# Patient Record
Sex: FEMALE | Race: OTHER | HISPANIC OR LATINO
[De-identification: names, ages, dates, MRNs, and addresses within clinical notes are randomized per-mention and may not be internally consistent; named-entity substitution may affect disease eponyms.]

---

## 2017-03-02 ENCOUNTER — APPOINTMENT (OUTPATIENT)
Dept: GASTROENTEROLOGY | Facility: CLINIC | Age: 72
End: 2017-03-02

## 2017-05-30 ENCOUNTER — APPOINTMENT (OUTPATIENT)
Dept: GASTROENTEROLOGY | Facility: CLINIC | Age: 72
End: 2017-05-30

## 2017-05-30 VITALS
DIASTOLIC BLOOD PRESSURE: 85 MMHG | BODY MASS INDEX: 25.91 KG/M2 | WEIGHT: 132 LBS | HEIGHT: 60 IN | SYSTOLIC BLOOD PRESSURE: 132 MMHG | HEART RATE: 75 BPM

## 2017-07-17 ENCOUNTER — OUTPATIENT (OUTPATIENT)
Dept: OUTPATIENT SERVICES | Facility: HOSPITAL | Age: 72
LOS: 1 days | End: 2017-07-17
Payer: COMMERCIAL

## 2017-07-17 ENCOUNTER — APPOINTMENT (OUTPATIENT)
Dept: GASTROENTEROLOGY | Facility: GI CENTER | Age: 72
End: 2017-07-17

## 2017-07-17 DIAGNOSIS — Z12.11 ENCOUNTER FOR SCREENING FOR MALIGNANT NEOPLASM OF COLON: ICD-10-CM

## 2017-07-17 DIAGNOSIS — Z86.010 PERSONAL HISTORY OF COLONIC POLYPS: ICD-10-CM

## 2017-07-17 PROCEDURE — T1013: CPT

## 2017-07-17 PROCEDURE — G0105: CPT

## 2018-01-08 ENCOUNTER — APPOINTMENT (OUTPATIENT)
Dept: NEUROLOGY | Facility: CLINIC | Age: 73
End: 2018-01-08
Payer: MEDICAID

## 2018-01-08 VITALS
BODY MASS INDEX: 25.91 KG/M2 | HEIGHT: 60 IN | WEIGHT: 132 LBS | DIASTOLIC BLOOD PRESSURE: 84 MMHG | SYSTOLIC BLOOD PRESSURE: 136 MMHG

## 2018-01-08 DIAGNOSIS — Z86.39 PERSONAL HISTORY OF OTHER ENDOCRINE, NUTRITIONAL AND METABOLIC DISEASE: ICD-10-CM

## 2018-01-08 PROCEDURE — 99204 OFFICE O/P NEW MOD 45 MIN: CPT

## 2018-01-08 RX ORDER — CYCLOBENZAPRINE HYDROCHLORIDE 10 MG/1
10 TABLET, FILM COATED ORAL
Refills: 0 | Status: COMPLETED | COMMUNITY
End: 2018-01-08

## 2018-01-08 RX ORDER — ASPIRIN ENTERIC COATED TABLETS 81 MG 81 MG/1
81 TABLET, DELAYED RELEASE ORAL
Refills: 0 | Status: ACTIVE | COMMUNITY

## 2018-01-08 RX ORDER — ENALAPRIL MALEATE 20 MG/1
20 TABLET ORAL
Refills: 0 | Status: ACTIVE | COMMUNITY

## 2018-01-08 RX ORDER — LOVASTATIN 10 MG/1
10 TABLET ORAL
Refills: 0 | Status: COMPLETED | COMMUNITY
End: 2018-01-08

## 2018-01-08 RX ORDER — POLYETHYLENE GLYOCOL 3350, SODIUM CHLORIDE, SODIUM BICARBONATE AND POTASSIUM CHLORIDE 420; 11.2; 5.72; 1.48 G/4L; G/4L; G/4L; G/4L
420 POWDER, FOR SOLUTION NASOGASTRIC; ORAL
Qty: 1 | Refills: 0 | Status: COMPLETED | COMMUNITY
Start: 2017-05-30 | End: 2018-01-08

## 2018-01-18 ENCOUNTER — APPOINTMENT (OUTPATIENT)
Dept: NEUROLOGY | Facility: CLINIC | Age: 73
End: 2018-01-18
Payer: MEDICAID

## 2018-01-18 PROCEDURE — 93886 INTRACRANIAL COMPLETE STUDY: CPT

## 2018-01-18 PROCEDURE — 93890: CPT

## 2018-01-18 PROCEDURE — 93880 EXTRACRANIAL BILAT STUDY: CPT

## 2018-02-14 ENCOUNTER — APPOINTMENT (OUTPATIENT)
Dept: NEUROLOGY | Facility: CLINIC | Age: 73
End: 2018-02-14
Payer: MEDICAID

## 2018-02-14 VITALS
WEIGHT: 132 LBS | DIASTOLIC BLOOD PRESSURE: 82 MMHG | SYSTOLIC BLOOD PRESSURE: 130 MMHG | HEIGHT: 60 IN | BODY MASS INDEX: 25.91 KG/M2

## 2018-02-14 PROCEDURE — 99214 OFFICE O/P EST MOD 30 MIN: CPT

## 2018-02-14 RX ORDER — LOVASTATIN 40 MG/1
40 TABLET ORAL
Qty: 30 | Refills: 0 | Status: COMPLETED | COMMUNITY
Start: 2017-11-30

## 2018-02-14 RX ORDER — ERGOCALCIFEROL 1.25 MG/1
1.25 MG CAPSULE, LIQUID FILLED ORAL
Qty: 4 | Refills: 0 | Status: ACTIVE | COMMUNITY
Start: 2017-12-30

## 2018-06-15 ENCOUNTER — APPOINTMENT (OUTPATIENT)
Dept: NEUROLOGY | Facility: CLINIC | Age: 73
End: 2018-06-15
Payer: MEDICAID

## 2018-06-15 VITALS
SYSTOLIC BLOOD PRESSURE: 120 MMHG | WEIGHT: 132 LBS | DIASTOLIC BLOOD PRESSURE: 60 MMHG | HEIGHT: 60 IN | BODY MASS INDEX: 25.91 KG/M2

## 2018-06-15 DIAGNOSIS — Z86.79 PERSONAL HISTORY OF OTHER DISEASES OF THE CIRCULATORY SYSTEM: ICD-10-CM

## 2018-06-15 PROCEDURE — 99214 OFFICE O/P EST MOD 30 MIN: CPT

## 2018-06-15 RX ORDER — VALACYCLOVIR 1 G/1
1 TABLET, FILM COATED ORAL
Qty: 30 | Refills: 0 | Status: COMPLETED | COMMUNITY
Start: 2017-12-30

## 2018-10-17 ENCOUNTER — APPOINTMENT (OUTPATIENT)
Dept: NEUROLOGY | Facility: CLINIC | Age: 73
End: 2018-10-17
Payer: MEDICAID

## 2018-10-17 VITALS
HEIGHT: 60 IN | BODY MASS INDEX: 25.13 KG/M2 | SYSTOLIC BLOOD PRESSURE: 110 MMHG | WEIGHT: 128 LBS | DIASTOLIC BLOOD PRESSURE: 56 MMHG

## 2018-10-17 PROCEDURE — 99213 OFFICE O/P EST LOW 20 MIN: CPT

## 2018-10-17 NOTE — PHYSICAL EXAM
[General Appearance - Alert] : alert [Oriented To Time, Place, And Person] : oriented to person, place, and time [Memory Recent] : recent memory was not impaired [Memory Remote] : remote memory was not impaired [Cranial Nerves Optic (II)] : visual acuity intact bilaterally,  visual fields full to confrontation, pupils equal round and reactive to light [Cranial Nerves Oculomotor (III)] : extraocular motion intact [Cranial Nerves Trigeminal (V)] : facial sensation intact symmetrically [Cranial Nerves Facial (VII)] : face symmetrical [Cranial Nerves Vestibulocochlear (VIII)] : hearing was intact bilaterally [Cranial Nerves Glossopharyngeal (IX)] : tongue and palate midline [Cranial Nerves Accessory (XI - Cranial And Spinal)] : head turning and shoulder shrug symmetric [Cranial Nerves Hypoglossal (XII)] : there was no tongue deviation with protrusion [Motor Tone] : muscle tone was normal in all four extremities [3+] : Patella right 3+ [2+] : Patella left 2+ [Plantar Reflex Right Only] : abnormal on the right [Aphasia] : no dysphasia/aphasia [Coordination - Dysmetria Impaired Finger-to-Nose Left Only] : not present on the left side [Plantar Reflex Left Only] : normal on the left [FreeTextEntry7] : There is slightly diminished sensation to light touch, pin, and vibration throughout the right side. [FreeTextEntry8] : Gait is broad-based and she requires a cane for ambulation. Balance is poor.

## 2018-10-17 NOTE — CONSULT LETTER
[Dear  ___] : Dear  [unfilled], [Courtesy Letter:] : I had the pleasure of seeing your patient, [unfilled], in my office today. [Please see my note below.] : Please see my note below. [Sincerely,] : Sincerely, [FreeTextEntry3] : Fransico High MD.

## 2018-10-17 NOTE — HISTORY OF PRESENT ILLNESS
[FreeTextEntry1] : I saw this patient in the office today.\par \par On 9/24/17 she developed right-sided weakness. This lasted about 15 minutes and resolved. She had a second episode a few hours later which also resolved. A few hours after that she has her episode at which persisted. She was in [place] at the time. She was taken to a hospital where she spent 3 days. Workup revealed a stroke and she was ultimately discharged with instructions for physical therapy.\par \par She continues to have right-sided weakness. \par The arm is involved more than the leg.\par This has improved since her last visit.\par It is with her daily.\par It is now mild.\par There is some associated sensory loss on the right side.\par There is no associated aphasia.\par \par

## 2018-10-17 NOTE — DATA REVIEWED
[de-identified] : The patient's daughter brought a page from her CT scan from [place]. \par The study demonstrates a small infarct in the left basal ganglia/internal capsule.\par \par Carotid ultrasounds and transcranial Dopplers were unremarkable in my office on 1/18/18.

## 2018-10-17 NOTE — ASSESSMENT
[FreeTextEntry1] : This is a 72-year-old woman who is status post stroke.\par \par I have recommended she continue antiplatelet and statin medication.\par \par I will see her back in 6 months.

## 2018-11-23 ENCOUNTER — EMERGENCY (EMERGENCY)
Facility: HOSPITAL | Age: 73
LOS: 1 days | Discharge: HOME | End: 2018-11-23
Attending: EMERGENCY MEDICINE
Payer: COMMERCIAL

## 2018-11-23 VITALS — HEIGHT: 60 IN | WEIGHT: 119.93 LBS

## 2018-11-23 VITALS
SYSTOLIC BLOOD PRESSURE: 122 MMHG | TEMPERATURE: 98 F | RESPIRATION RATE: 18 BRPM | HEART RATE: 72 BPM | DIASTOLIC BLOOD PRESSURE: 74 MMHG | OXYGEN SATURATION: 99 %

## 2018-11-23 PROCEDURE — 99283 EMERGENCY DEPT VISIT LOW MDM: CPT | Mod: 25

## 2018-11-23 PROCEDURE — 73110 X-RAY EXAM OF WRIST: CPT | Mod: 26,RT

## 2018-11-23 PROCEDURE — 99284 EMERGENCY DEPT VISIT MOD MDM: CPT | Mod: 25

## 2018-11-23 PROCEDURE — 29125 APPL SHORT ARM SPLINT STATIC: CPT | Mod: RT

## 2018-11-23 PROCEDURE — 73110 X-RAY EXAM OF WRIST: CPT

## 2018-11-23 PROCEDURE — 29125 APPL SHORT ARM SPLINT STATIC: CPT

## 2018-11-23 RX ORDER — ACETAMINOPHEN 500 MG
975 TABLET ORAL ONCE
Qty: 0 | Refills: 0 | Status: COMPLETED | OUTPATIENT
Start: 2018-11-23 | End: 2018-11-23

## 2018-11-23 RX ADMIN — Medication 975 MILLIGRAM(S): at 23:03

## 2018-11-23 RX ADMIN — Medication 975 MILLIGRAM(S): at 21:24

## 2018-11-23 NOTE — ED STATDOCS - OBJECTIVE STATEMENT
71 y/o F pt with PMHx HTN and HLD presents to the ED with daughter for R wrist fracture s/p falling on Nov 10th.  Pt was in the bathroom in the tub when she stood up, slipped and fell down to the ground.  Pt did not go into the ED s/p her fall because she had an upcoming Pain Management appt.   After seeing her doctor, she was sent today to get an x-ray, which shows a R wrist fracture.  Pt is R hand dominant.  Pt has been taking ibuprofen for her pain, last dosage at 17:00 today.  THIS IS NOT A SYNCOPE!! Denies LOC, fever, chills, N/V, CP, SOB.  No further acute complaints at this time.   Pain Management: Dr. Martinez 73 y/o F pt with PMHx HTN and HLD presents to the ED with daughter for R wrist fracture s/p falling on Nov 10th.  Pt was in the bathroom in the tub when she stood up, slipped and fell down to the ground.  Pt did not go into the ED s/p her fall because she had an upcoming Pain Management appt.  After seeing her doctor, she was sent today to get an x-ray, which shows a R wrist fracture.  Pt is R hand dominant.  Pt has been taking ibuprofen for her pain, last dosage at 17:00 today.  THIS IS NOT A SYNCOPE!! Denies LOC, fever, chills, N/V, CP, SOB.  No further acute complaints at this time.   Pain Management: Dr. Martinez

## 2018-11-23 NOTE — ED ADULT NURSE NOTE - INTERVENTIONS DEFINITIONS
Instruct patient to call for assistance/[place] to call system/Non-slip footwear when patient is off stretcher/Stretcher in lowest position, wheels locked, appropriate side rails in place/Call bell, personal items and telephone within reach

## 2018-11-23 NOTE — ED ADULT NURSE NOTE - OBJECTIVE STATEMENT
assumed care of pt in room. pt a&ox3. pt's daughter at bedside. pt agrees to allow daughter to translate for her. pt states that she fell on 11/10/18. pt slipped on water in the bathroom and fell, hitting her R side on the floor. pt went to the doctor 11/21/18, doctor sent pt for xray. xray was done outside hospital today. pt's daughter has xray results in hand. pt's daughter states that she had a stroke 1 year ago and that she lost the peripheral vision of her R eye and doesn't know if that is why she fell. assumed care of pt in room. pt a&ox3. pt's daughter at bedside. pt agrees to allow daughter to translate for her. pt states that she fell on 11/10/18. pt slipped on water in the bathroom and fell, hitting her R side on the floor. pt went to the doctor 11/21/18, doctor sent pt for xray. xray was done outside hospital today. pt's daughter has xray results in hand. pt's daughter states that she had a stroke 1 year ago and that she lost the peripheral vision of her R eye and doesn't know if that is why she fell. visible bruising found on R wrist and lateral forearm. pt c/o pain to R outer hip as well.

## 2018-11-23 NOTE — ED STATDOCS - PHYSICAL EXAMINATION
Constitutional: in no apparent distress, speaking in full sentences  HEENT: neck supple, FROM, tongue is pink, moist and midline  EYES: non-injected, non-icteric, PERRL, EOMI  RESPIRATORY: lungs clear  CARDIAC: S1 S2, regular rate, moving chest wall symmetrically, no pedal edema  GI: bowel sounds present, abdomen soft, non-tender, no pain on axial loading  : no CVA tenderness  MSK: spine is midline, no calf tenderness, no LE long bone deformities  SKIN: no jaundice  NEURO: awake and alert

## 2018-11-23 NOTE — ED STATDOCS - PROGRESS NOTE DETAILS
PT evaluated by intake physician. HPI/PE/ROS as noted above. Will follow up plan per intake physician. Reviewed x-ray, pt put in sugar tong splint, orthopedic referral, pt explained results and d/c instructions

## 2018-11-23 NOTE — ED STATDOCS - MEDICAL DECISION MAKING DETAILS
sent from  for R fracture, sustained s/p mechanical fall several weeks ago, placed in ST for sugar tong splint sent from  for R wrist fracture, sustained s/p mechanical fall several weeks ago, placed in ST for sugar tong splint

## 2018-11-23 NOTE — ED STATDOCS - ATTENDING CONTRIBUTION TO CARE
I, Pop Flaherty, performed the initial face to face bedside interview with this patient regarding history of present illness, review of symptoms and relevant past medical, social and family history.  I completed an independent physical examination.  I was the initial provider who evaluated this patient. I have signed out the follow up of any pending tests (i.e. labs, radiological studies) to the ACP.  I have communicated the patient’s plan of care and disposition with the ACP.

## 2018-11-30 PROBLEM — M81.0 AGE-RELATED OSTEOPOROSIS WITHOUT CURRENT PATHOLOGICAL FRACTURE: Chronic | Status: ACTIVE | Noted: 2018-11-23

## 2018-11-30 PROBLEM — I10 ESSENTIAL (PRIMARY) HYPERTENSION: Chronic | Status: ACTIVE | Noted: 2018-11-23

## 2018-11-30 PROBLEM — I63.9 CEREBRAL INFARCTION, UNSPECIFIED: Chronic | Status: ACTIVE | Noted: 2018-11-23

## 2018-11-30 PROBLEM — E78.00 PURE HYPERCHOLESTEROLEMIA, UNSPECIFIED: Chronic | Status: ACTIVE | Noted: 2018-11-23

## 2018-12-19 ENCOUNTER — APPOINTMENT (OUTPATIENT)
Dept: ORTHOPEDIC SURGERY | Facility: CLINIC | Age: 73
End: 2018-12-19
Payer: MEDICAID

## 2018-12-19 VITALS
DIASTOLIC BLOOD PRESSURE: 70 MMHG | BODY MASS INDEX: 25.13 KG/M2 | HEART RATE: 72 BPM | SYSTOLIC BLOOD PRESSURE: 131 MMHG | WEIGHT: 128 LBS | HEIGHT: 60 IN

## 2018-12-19 PROCEDURE — 73110 X-RAY EXAM OF WRIST: CPT | Mod: RT

## 2018-12-19 PROCEDURE — 25600 CLTX DST RDL FX/EPHYS SEP WO: CPT | Mod: RT

## 2018-12-19 PROCEDURE — 99204 OFFICE O/P NEW MOD 45 MIN: CPT | Mod: 57

## 2018-12-21 ENCOUNTER — MESSAGE (OUTPATIENT)
Age: 73
End: 2018-12-21

## 2019-01-10 ENCOUNTER — RECORD ABSTRACTING (OUTPATIENT)
Age: 74
End: 2019-01-10

## 2019-01-10 DIAGNOSIS — Z86.39 PERSONAL HISTORY OF OTHER ENDOCRINE, NUTRITIONAL AND METABOLIC DISEASE: ICD-10-CM

## 2019-01-10 DIAGNOSIS — Z86.73 PERSONAL HISTORY OF TRANSIENT ISCHEMIC ATTACK (TIA), AND CEREBRAL INFARCTION W/OUT RESIDUAL DEFICITS: ICD-10-CM

## 2019-01-10 DIAGNOSIS — Z83.3 FAMILY HISTORY OF DIABETES MELLITUS: ICD-10-CM

## 2019-01-10 DIAGNOSIS — E03.9 HYPOTHYROIDISM, UNSPECIFIED: ICD-10-CM

## 2019-01-10 DIAGNOSIS — E04.2 NONTOXIC MULTINODULAR GOITER: ICD-10-CM

## 2019-01-10 DIAGNOSIS — I69.359 HEMIPLEGIA AND HEMIPARESIS FOLLOWING CEREBRAL INFARCTION AFFECTING UNSPECIFIED SIDE: ICD-10-CM

## 2019-01-10 DIAGNOSIS — E55.9 VITAMIN D DEFICIENCY, UNSPECIFIED: ICD-10-CM

## 2019-01-10 RX ORDER — LOVASTATIN 10 MG/1
10 TABLET ORAL
Refills: 0 | Status: ACTIVE | COMMUNITY

## 2019-01-12 ENCOUNTER — OUTPATIENT (OUTPATIENT)
Dept: OUTPATIENT SERVICES | Facility: HOSPITAL | Age: 74
LOS: 1 days | End: 2019-01-12
Payer: COMMERCIAL

## 2019-01-12 DIAGNOSIS — M25.531 PAIN IN RIGHT WRIST: ICD-10-CM

## 2019-01-12 DIAGNOSIS — S52.531A COLLES' FRACTURE OF RIGHT RADIUS, INITIAL ENCOUNTER FOR CLOSED FRACTURE: ICD-10-CM

## 2019-01-12 DIAGNOSIS — Z51.89 ENCOUNTER FOR OTHER SPECIFIED AFTERCARE: ICD-10-CM

## 2019-01-12 DIAGNOSIS — M25.641 STIFFNESS OF RIGHT HAND, NOT ELSEWHERE CLASSIFIED: ICD-10-CM

## 2019-01-16 ENCOUNTER — APPOINTMENT (OUTPATIENT)
Dept: ENDOCRINOLOGY | Facility: CLINIC | Age: 74
End: 2019-01-16

## 2019-02-06 ENCOUNTER — APPOINTMENT (OUTPATIENT)
Dept: ORTHOPEDIC SURGERY | Facility: CLINIC | Age: 74
End: 2019-02-06
Payer: MEDICAID

## 2019-02-06 VITALS
BODY MASS INDEX: 25.13 KG/M2 | HEIGHT: 60 IN | DIASTOLIC BLOOD PRESSURE: 62 MMHG | HEART RATE: 74 BPM | WEIGHT: 128 LBS | SYSTOLIC BLOOD PRESSURE: 123 MMHG

## 2019-02-06 DIAGNOSIS — S52.531A COLLES' FRACTURE OF RIGHT RADIUS, INITIAL ENCOUNTER FOR CLOSED FRACTURE: ICD-10-CM

## 2019-02-06 PROCEDURE — 99024 POSTOP FOLLOW-UP VISIT: CPT

## 2019-02-06 PROCEDURE — 73110 X-RAY EXAM OF WRIST: CPT | Mod: RT

## 2019-02-08 PROBLEM — S52.531A CLOSED COLLES' FRACTURE OF RIGHT RADIUS, INITIAL ENCOUNTER: Status: ACTIVE | Noted: 2018-12-19

## 2019-02-08 NOTE — PHYSICAL EXAM
[de-identified] : GENERAL: Awake, alert, cooperative, answers questions appropriately.  No acute distress.  Ambulates independently\par SKIN: Warm, dry, intact.  Color and turgor normal.\par LUNGS: Unlabored breathing on room air with no accessory muscle use.\par EXTREMITIES: Warm and well perfused.\par \par FOCUSED UPPER EXTREMITY EXAM\par \par RUE\par -skin clean and intact, no ecchymosis, minimal swelling over the dorsum of the wrist\par -No thenar atrophy; no intrinsics wasting\par -Mild tenderness to palpation at fracture site over the dorsum of the wrist\par -able to range all fingers\par -Decreased wrist ROM; decreased forearm supination/pronation\par -sensation intact in the radial/median/ulnar nerve distributions although globally decreased compared to the left\par -brisk capillary refill, fingers warm and well perfused\par  [de-identified] : X-rays of the right wrist (3 views) were obtained in the office today, and reviewed with the patient and her daughter, showing healing of the distal radius fracture.  There is a slight dorsal tilt of the radiocarpal joint, unchanged from her previous x-rays on 12/19/18.

## 2019-02-08 NOTE — HISTORY OF PRESENT ILLNESS
[FreeTextEntry1] : 02/06/2019\par Ms. YASMIN GREEN returns for follow of R distal radius fracture (DOI 11/10/18) treated without surgery.  She was doing well with therapy and making good progress.  She still has some stiffness and occasional pain at the fracture site but improving.  She has baseline decreased sensation on the right side from a stroke in 2017 but denies new symptoms.\par \par 12/19/2018 \par YASMIN GREEN is a pleasant 72-year-old female, right-hand-dominant, retired teacher. She is Italian-speaking. She presents to the office with her daughter for evaluation of a right wrist injury from a fall at home on 11/10/18. She initially thought it was a wrist sprain, however she had persistent pain in the wrist and was evaluated in the emergency room on 11/23/18 with x-rays, showing distal radius fracture on the right. She was placed in a sugar tong splint and referred here for followup. She reports that since the injury her pain has improved. She denies paresthesias. Of note she had a stroke in 2017 with a right-sided deficit including decreased sensation globally.

## 2019-02-08 NOTE — DISCUSSION/SUMMARY
[FreeTextEntry1] : 72-year-old female with a right distal radius fracture, with slight dorsal tilt, from 11/10/18, and now almost completely healed.\par \par -We discussed in detail the clinical and radiographic findings\par -We discussed the pathophysiology and natural history of patient's condition\par -I recommended to start weaning off her brace.\par -I updated her OT prescription to start strengthening in addition to the ROM exercises, she can be WBAT\par -I will see her as needed\par -Patient and her daughter had the opportunity to ask questions and they were in agreement with the plan

## 2019-02-12 PROCEDURE — 97110 THERAPEUTIC EXERCISES: CPT

## 2019-02-12 PROCEDURE — 97140 MANUAL THERAPY 1/> REGIONS: CPT

## 2019-02-12 PROCEDURE — 97750 PHYSICAL PERFORMANCE TEST: CPT

## 2019-02-12 PROCEDURE — 97166 OT EVAL MOD COMPLEX 45 MIN: CPT

## 2019-02-12 PROCEDURE — 97010 HOT OR COLD PACKS THERAPY: CPT

## 2019-02-12 PROCEDURE — 97163 PT EVAL HIGH COMPLEX 45 MIN: CPT

## 2019-02-12 PROCEDURE — 97112 NEUROMUSCULAR REEDUCATION: CPT

## 2019-02-19 ENCOUNTER — OTHER (OUTPATIENT)
Age: 74
End: 2019-02-19

## 2019-04-17 ENCOUNTER — APPOINTMENT (OUTPATIENT)
Dept: NEUROLOGY | Facility: CLINIC | Age: 74
End: 2019-04-17

## 2019-08-15 ENCOUNTER — APPOINTMENT (OUTPATIENT)
Dept: NEUROLOGY | Facility: CLINIC | Age: 74
End: 2019-08-15
Payer: MEDICAID

## 2019-08-15 VITALS
HEIGHT: 60 IN | SYSTOLIC BLOOD PRESSURE: 120 MMHG | BODY MASS INDEX: 25.13 KG/M2 | DIASTOLIC BLOOD PRESSURE: 75 MMHG | WEIGHT: 128 LBS

## 2019-08-15 PROCEDURE — 99213 OFFICE O/P EST LOW 20 MIN: CPT

## 2019-08-15 NOTE — CONSULT LETTER
[Dear  ___] : Dear  [unfilled], [Courtesy Letter:] : I had the pleasure of seeing your patient, [unfilled], in my office today. [Sincerely,] : Sincerely, [Please see my note below.] : Please see my note below. [FreeTextEntry3] : Fransico High MD.

## 2019-08-15 NOTE — HISTORY OF PRESENT ILLNESS
[FreeTextEntry1] : I saw this patient in the office today.\par \par On 9/24/17 she developed right-sided weakness. This lasted about 15 minutes and resolved. She had a second episode a few hours later which also resolved. A few hours after that she has her episode at which persisted. She was in [place] at the time. She was taken to a hospital where she spent 3 days. Workup revealed a stroke and she was ultimately discharged with instructions for physical therapy.\par \par She continues to have right-sided weakness. \par The arm is involved more than the leg.\par This has improved since her last visit.\par It is with her daily.\par It is now mild.\par There is some associated sensory loss on the right side.\par There is no associated aphasia.\par \par She sustained a right wrist fracture in a fall.\par She followed with an orthopedist for this.\par

## 2019-08-15 NOTE — PHYSICAL EXAM
[General Appearance - Alert] : alert [Oriented To Time, Place, And Person] : oriented to person, place, and time [Memory Recent] : recent memory was not impaired [Memory Remote] : remote memory was not impaired [Cranial Nerves Optic (II)] : visual acuity intact bilaterally,  visual fields full to confrontation, pupils equal round and reactive to light [Cranial Nerves Trigeminal (V)] : facial sensation intact symmetrically [Cranial Nerves Oculomotor (III)] : extraocular motion intact [Cranial Nerves Facial (VII)] : face symmetrical [Cranial Nerves Vestibulocochlear (VIII)] : hearing was intact bilaterally [Cranial Nerves Glossopharyngeal (IX)] : tongue and palate midline [Cranial Nerves Accessory (XI - Cranial And Spinal)] : head turning and shoulder shrug symmetric [Cranial Nerves Hypoglossal (XII)] : there was no tongue deviation with protrusion [Motor Tone] : muscle tone was normal in all four extremities [3+] : Patella right 3+ [2+] : Patella left 2+ [Plantar Reflex Right Only] : abnormal on the right [Aphasia] : no dysphasia/aphasia [Coordination - Dysmetria Impaired Finger-to-Nose Left Only] : not present on the left side [Plantar Reflex Left Only] : normal on the left [FreeTextEntry7] : There is slightly diminished sensation to light touch, pin, and vibration throughout the right side. [FreeTextEntry8] : Gait is broad-based and she requires a cane for ambulation. Balance is poor.

## 2019-08-15 NOTE — DATA REVIEWED
[de-identified] : The patient's daughter brought a page from her CT scan from [place]. \par The study demonstrates a small infarct in the left basal ganglia/internal capsule.\par \par Carotid ultrasounds and transcranial Dopplers were unremarkable in my office on 1/18/18.

## 2019-08-15 NOTE — ASSESSMENT
[FreeTextEntry1] : This is a 73 year-old woman who is status post stroke.\par \par I have recommended she continue antiplatelet and statin medication.\par \par I will see her back in 6 months.

## 2020-02-19 ENCOUNTER — APPOINTMENT (OUTPATIENT)
Dept: NEUROLOGY | Facility: CLINIC | Age: 75
End: 2020-02-19
Payer: MEDICAID

## 2020-02-19 VITALS
HEIGHT: 60 IN | DIASTOLIC BLOOD PRESSURE: 74 MMHG | WEIGHT: 123 LBS | BODY MASS INDEX: 24.15 KG/M2 | SYSTOLIC BLOOD PRESSURE: 112 MMHG

## 2020-02-19 PROCEDURE — 99213 OFFICE O/P EST LOW 20 MIN: CPT

## 2020-02-19 NOTE — ASSESSMENT
[FreeTextEntry1] : This is a 74 year-old woman who is status post stroke.\par \par I have recommended she continue antiplatelet and statin medication.\par \par I will see her back in 6 months.

## 2020-02-19 NOTE — PHYSICAL EXAM
[Oriented To Time, Place, And Person] : oriented to person, place, and time [General Appearance - In No Acute Distress] : in no acute distress [General Appearance - Alert] : alert [Memory Remote] : remote memory was not impaired [Memory Recent] : recent memory was not impaired [Affect] : the affect was normal [Cranial Nerves Oculomotor (III)] : extraocular motion intact [Cranial Nerves Optic (II)] : visual acuity intact bilaterally,  visual fields full to confrontation, pupils equal round and reactive to light [Cranial Nerves Facial (VII)] : face symmetrical [Cranial Nerves Trigeminal (V)] : facial sensation intact symmetrically [Cranial Nerves Vestibulocochlear (VIII)] : hearing was intact bilaterally [Cranial Nerves Accessory (XI - Cranial And Spinal)] : head turning and shoulder shrug symmetric [Cranial Nerves Glossopharyngeal (IX)] : tongue and palate midline [Motor Tone] : muscle tone was normal in all four extremities [Cranial Nerves Hypoglossal (XII)] : there was no tongue deviation with protrusion [3+] : Patella right 3+ [Plantar Reflex Right Only] : abnormal on the right [2+] : Patella left 2+ [Optic Disc Abnormality] : the optic disc were normal in size and color [Involuntary Movements] : no involuntary movements were seen [Edema] : there was no peripheral edema [Aphasia] : no dysphasia/aphasia [Coordination - Dysmetria Impaired Finger-to-Nose Left Only] : not present on the left side [Plantar Reflex Left Only] : normal on the left [FreeTextEntry7] : There is slightly diminished sensation to light touch, pin, and vibration throughout the right side. [FreeTextEntry8] : Gait is broad-based and she requires a cane for ambulation. Balance is poor.

## 2020-02-19 NOTE — HISTORY OF PRESENT ILLNESS
[FreeTextEntry1] : I saw this patient in the office today.\par \par On 9/24/17 she developed right-sided weakness. This lasted about 15 minutes and resolved. She had a second episode a few hours later which also resolved. A few hours after that she has her episode at which persisted. She was in [place] at the time. She was taken to a hospital where she spent 3 days. Workup revealed a stroke and she was ultimately discharged with instructions for physical therapy.\par \par She continues to have right-sided weakness. \par The arm is involved more than the leg.\par This has improved since her last visit.\par It is with her daily.\par It is now mild.\par There is some associated sensory loss on the right side.\par There is no associated aphasia.\par \par She sustained a right wrist fracture in a fall.\par She followed with an orthopedist for this.\par \par \par

## 2020-02-19 NOTE — DATA REVIEWED
[de-identified] : The patient's daughter brought a page from her CT scan from [place]. \par The study demonstrates a small infarct in the left basal ganglia/internal capsule.\par \par Carotid ultrasounds and transcranial Dopplers were unremarkable in my office on 1/18/18.

## 2020-10-22 ENCOUNTER — APPOINTMENT (OUTPATIENT)
Dept: NEUROLOGY | Facility: CLINIC | Age: 75
End: 2020-10-22
Payer: MEDICAID

## 2020-10-22 VITALS
WEIGHT: 123 LBS | SYSTOLIC BLOOD PRESSURE: 110 MMHG | DIASTOLIC BLOOD PRESSURE: 70 MMHG | BODY MASS INDEX: 24.15 KG/M2 | HEIGHT: 60 IN | TEMPERATURE: 96.7 F

## 2020-10-22 PROCEDURE — 99072 ADDL SUPL MATRL&STAF TM PHE: CPT

## 2020-10-22 PROCEDURE — 99213 OFFICE O/P EST LOW 20 MIN: CPT

## 2020-10-22 NOTE — DATA REVIEWED
[de-identified] : The patient's daughter brought a page from her CT scan from [place]. \par The study demonstrated a small infarct in the left basal ganglia/internal capsule.\par \par Carotid ultrasounds and transcranial Dopplers were unremarkable in my office on 1/18/18.

## 2020-10-22 NOTE — PHYSICAL EXAM
[General Appearance - Alert] : alert [General Appearance - In No Acute Distress] : in no acute distress [Oriented To Time, Place, And Person] : oriented to person, place, and time [Affect] : the affect was normal [Memory Recent] : recent memory was not impaired [Memory Remote] : remote memory was not impaired [Cranial Nerves Optic (II)] : visual acuity intact bilaterally,  visual fields full to confrontation, pupils equal round and reactive to light [Cranial Nerves Oculomotor (III)] : extraocular motion intact [Cranial Nerves Trigeminal (V)] : facial sensation intact symmetrically [Cranial Nerves Facial (VII)] : face symmetrical [Cranial Nerves Vestibulocochlear (VIII)] : hearing was intact bilaterally [Cranial Nerves Glossopharyngeal (IX)] : tongue and palate midline [Cranial Nerves Accessory (XI - Cranial And Spinal)] : head turning and shoulder shrug symmetric [Cranial Nerves Hypoglossal (XII)] : there was no tongue deviation with protrusion [Motor Tone] : muscle tone was normal in all four extremities [3+] : Patella right 3+ [2+] : Patella left 2+ [Plantar Reflex Right Only] : abnormal on the right [Optic Disc Abnormality] : the optic disc were normal in size and color [Edema] : there was no peripheral edema [Involuntary Movements] : no involuntary movements were seen [Aphasia] : no dysphasia/aphasia [Coordination - Dysmetria Impaired Finger-to-Nose Left Only] : not present on the left side [Plantar Reflex Left Only] : normal on the left [FreeTextEntry7] : There is slightly diminished sensation to light touch, pin, and vibration throughout the right side. [FreeTextEntry8] : Gait is broad-based and she requires a cane for ambulation. Balance is poor.

## 2020-10-22 NOTE — HISTORY OF PRESENT ILLNESS
[FreeTextEntry1] : I saw this patient in the office today.\par \par As you recall, on 9/24/17 she developed right-sided weakness. This lasted about 15 minutes and resolved. She had a second episode a few hours later which also resolved. A few hours after that she has her episode at which persisted. She was in [place] at the time. She was taken to a hospital where she spent 3 days. Workup revealed a stroke and she was ultimately discharged with instructions for physical therapy.\par \par She continues to have right-sided weakness. \par The arm is involved more than the leg.\par This has improved since her last visit.\par It is with her daily.\par It is now mild.\par There is some associated sensory loss on the right side.\par There is no associated aphasia.\par \par She sustained a right wrist fracture in a fall.\par She followed with an orthopedist for this.\par \par \par

## 2020-10-22 NOTE — ASSESSMENT
[FreeTextEntry1] : This is a 74 year-old woman who is status post stroke.\par \par I have recommended she continue antiplatelet and statin medication.\par \par Her blood pressure is well controlled on her current regimen. \par \par I will see her back in 6 months.

## 2020-11-08 ENCOUNTER — TRANSCRIPTION ENCOUNTER (OUTPATIENT)
Age: 75
End: 2020-11-08

## 2021-02-04 ENCOUNTER — APPOINTMENT (OUTPATIENT)
Dept: RHEUMATOLOGY | Facility: CLINIC | Age: 76
End: 2021-02-04
Payer: MEDICAID

## 2021-02-04 VITALS
OXYGEN SATURATION: 98 % | RESPIRATION RATE: 17 BRPM | BODY MASS INDEX: 25.32 KG/M2 | DIASTOLIC BLOOD PRESSURE: 56 MMHG | SYSTOLIC BLOOD PRESSURE: 110 MMHG | HEIGHT: 60 IN | HEART RATE: 70 BPM | WEIGHT: 129 LBS | TEMPERATURE: 97.9 F

## 2021-02-04 PROCEDURE — 99072 ADDL SUPL MATRL&STAF TM PHE: CPT

## 2021-02-04 PROCEDURE — 99204 OFFICE O/P NEW MOD 45 MIN: CPT

## 2021-02-04 NOTE — HISTORY OF PRESENT ILLNESS
[FreeTextEntry1] : 74 yo woman with history HTN,HLD, Stroke, hypothyroidism and osteoporosis with multiple fracture ( wrist/compression fracture/pelvic-all considered osteoporotic fx) previously on alendronate and prolia started in 2018.  she has 4 porlia injection.  she states that she experiences severe body pain after the prolia that last about 3 days. she also had a rash with prolia.  she does not want to continue prolia.    patient has DEXA 10/2020 showing T score L1-L4 -3.0  ( from -3.6 in 2016) and femoral neck -2.6 ( stable from 2016) \par \par patient denies any smoking or alcohol. no history of RA or steroid use.  mother with history of fractures.  she has a history of hypothyroidism.  \par \par patient is taking calcium and vit d.  \par  [Fever] : no fever [Chills] : no chills [Skin Lesions] : no lesions [Skin Nodules] : no skin nodules [Oral Ulcers] : no oral ulcers [Cough] : no cough [Dysphagia] : no dysphagia [Shortness of Breath] : no shortness of breath [Chest Pain] : no chest pain [Joint Swelling] : no joint swelling [Joint Warmth] : no joint warmth [Joint Deformity] : no joint deformity [Morning Stiffness] : no morning stiffness [Dyspnea] : no dyspnea [Eye Pain] : no eye pain [Eye Redness] : no eye redness [Dry Eyes] : no dry eyes

## 2021-02-04 NOTE — PHYSICAL EXAM
[General Appearance - Well Nourished] : well nourished [General Appearance - Well Developed] : well developed [Sclera] : the sclera and conjunctiva were normal [Hearing Threshold Finger Rub Not [place]] : hearing was normal [Nail Clubbing] : no clubbing  or cyanosis of the fingernails [Musculoskeletal - Swelling] : no joint swelling seen [Motor Tone] : muscle strength and tone were normal [] : no rash [Skin Lesions] : no skin lesions [Affect] : the affect was normal [Mood] : the mood was normal

## 2021-02-04 NOTE — ASSESSMENT
[FreeTextEntry1] : 74 yo woman with history of HTN,HLD , stroke , hypothyroidism and osteoporosis with multiple fractures.  had prolia with improvement in lumbar bone density however patient developed rash and severe body pain on last prolia.  \par \par --will start reclast march 20 ,2020\par --cont calcium and vit d \par --will check vit d and OP labs

## 2021-02-04 NOTE — REVIEW OF SYSTEMS
[Fever] : no fever [Chills] : no chills [Eye Pain] : no eye pain [Nosebleeds] : no nosebleeds [Chest Pain] : no chest pain [Palpitations] : no palpitations [Cough] : no cough [SOB on Exertion] : no shortness of breath during exertion [Diarrhea] : no diarrhea [Melena] : no melena [Joint Pain] : no joint pain [Joint Swelling] : no joint swelling [Joint Stiffness] : no joint stiffness

## 2021-03-11 ENCOUNTER — APPOINTMENT (OUTPATIENT)
Dept: RHEUMATOLOGY | Facility: CLINIC | Age: 76
End: 2021-03-11
Payer: MEDICAID

## 2021-03-11 VITALS
RESPIRATION RATE: 17 BRPM | TEMPERATURE: 97.2 F | BODY MASS INDEX: 24.94 KG/M2 | HEART RATE: 83 BPM | DIASTOLIC BLOOD PRESSURE: 62 MMHG | SYSTOLIC BLOOD PRESSURE: 112 MMHG | HEIGHT: 60 IN | OXYGEN SATURATION: 99 % | WEIGHT: 127 LBS

## 2021-03-11 PROCEDURE — 99072 ADDL SUPL MATRL&STAF TM PHE: CPT

## 2021-03-11 PROCEDURE — 99214 OFFICE O/P EST MOD 30 MIN: CPT

## 2021-03-11 RX ORDER — ALENDRONATE SODIUM 70 MG/1
70 TABLET ORAL
Qty: 4 | Refills: 0 | Status: DISCONTINUED | COMMUNITY
Start: 2017-12-30 | End: 2021-03-11

## 2021-03-11 NOTE — DATA REVIEWED
[FreeTextEntry1] : normal cmp, pth, mg, phos, spep\par vit d 36 \par tsh 5.98 ( increased levothyroxine to 50 from 25 3 weeks ago)

## 2021-03-11 NOTE — PHYSICAL EXAM
[General Appearance - Well Nourished] : well nourished [General Appearance - Well Developed] : well developed [Sclera] : the sclera and conjunctiva were normal [Hearing Threshold Finger Rub Not [place]] : hearing was normal [Nail Clubbing] : no clubbing  or cyanosis of the fingernails [Musculoskeletal - Swelling] : no joint swelling seen [Motor Tone] : muscle strength and tone were normal [] : no rash [Skin Lesions] : no skin lesions [Affect] : the affect was normal [Mood] : the mood was normal

## 2021-03-11 NOTE — REVIEW OF SYSTEMS
[Fever] : no fever [Chills] : no chills [Eye Pain] : no eye pain [Red Eyes] : eyes not red [Nosebleeds] : no nosebleeds [Chest Pain] : no chest pain [Palpitations] : no palpitations [Diarrhea] : no diarrhea

## 2021-03-11 NOTE — ASSESSMENT
[FreeTextEntry1] : 74 yo woman with hypothyroidism osteoporosis with multiple fractures previously on fosamax and prolia.  on prolia had improvement in bone density of the spine however there was possible rash associated to prolia.  will start reclast.\par \par --patient was unable to get reclast today due to daugther not being to wait \par --she will be called for reclast \par --cont calcium and vit d

## 2021-03-11 NOTE — HISTORY OF PRESENT ILLNESS
[FreeTextEntry1] : 74 yo woman with history HTN,HLD, Stroke, hypothyroidism and osteoporosis with multiple fracture ( wrist/compression fracture/pelvic-all considered osteoporotic fx) previously on alendronate and prolia started in 2018.  she has 4 porlia injection.  she states that she experiences severe body pain after the prolia that last about 3 days. she also had a rash with prolia.  she does not want to continue prolia.    patient has DEXA 10/2020 showing T score L1-L4 -3.0  ( from -3.6 in 2016) and femoral neck -2.6 ( stable from 2016) \par \par patient denies any smoking or alcohol. no history of RA or steroid use.  mother with history of fractures.  she has a history of hypothyroidism.  \par \par patient is taking calcium and vit d.  \par \par \par today: patient had blood work done and is noted to have mildly elevated TSh.  Her PMD has increased levothyroxine  to 50mg form 25 just 3 weeks ago.  patient is followed by pain management for back pain and was getting epidurals with intermittent pain relieve.  she had covid vaccine yesterday and has arm pain.  patient does not want prolia given that she associates this to body pain and possible rash.  \par \par

## 2021-03-16 ENCOUNTER — APPOINTMENT (OUTPATIENT)
Dept: RHEUMATOLOGY | Facility: CLINIC | Age: 76
End: 2021-03-16
Payer: MEDICAID

## 2021-03-16 VITALS
DIASTOLIC BLOOD PRESSURE: 67 MMHG | TEMPERATURE: 98 F | RESPIRATION RATE: 16 BRPM | SYSTOLIC BLOOD PRESSURE: 125 MMHG | OXYGEN SATURATION: 99 % | HEART RATE: 83 BPM

## 2021-03-16 PROCEDURE — 96374 THER/PROPH/DIAG INJ IV PUSH: CPT

## 2021-03-16 PROCEDURE — 99072 ADDL SUPL MATRL&STAF TM PHE: CPT

## 2021-03-25 RX ORDER — ZOLEDRONIC ACID 5 MG/100ML
5 INJECTION INTRAVENOUS
Qty: 0 | Refills: 0 | Status: COMPLETED | OUTPATIENT
Start: 2021-03-16

## 2021-07-22 ENCOUNTER — APPOINTMENT (OUTPATIENT)
Dept: NEUROLOGY | Facility: CLINIC | Age: 76
End: 2021-07-22
Payer: MEDICAID

## 2021-07-22 VITALS
HEIGHT: 60 IN | BODY MASS INDEX: 24.94 KG/M2 | SYSTOLIC BLOOD PRESSURE: 106 MMHG | WEIGHT: 127 LBS | DIASTOLIC BLOOD PRESSURE: 64 MMHG

## 2021-07-22 PROCEDURE — 99072 ADDL SUPL MATRL&STAF TM PHE: CPT

## 2021-07-22 PROCEDURE — 99213 OFFICE O/P EST LOW 20 MIN: CPT

## 2021-07-22 NOTE — ASSESSMENT
[FreeTextEntry1] : This is a 75 year-old woman who is status post stroke.\par Her examination remains stable. \par I have recommended she continue antiplatelet and statin medication.\par \par Her blood pressure is well controlled on her current regimen. \par \par I will see her back in one year.

## 2021-07-22 NOTE — DATA REVIEWED
[de-identified] : The patient's daughter brought a page from her CT scan from [place]. \par The study demonstrated a small infarct in the left basal ganglia/internal capsule.\par \par Carotid ultrasounds and transcranial Dopplers were unremarkable in my office on 1/18/18.

## 2021-07-22 NOTE — HISTORY OF PRESENT ILLNESS
[FreeTextEntry1] : I saw this patient in the office today.\par \par As you recall, on 9/24/17 she developed right-sided weakness. This lasted about 15 minutes and resolved. She had a second episode a few hours later which also resolved. A few hours after that she has her episode at which persisted. She was in [place] at the time. She was taken to a hospital where she spent 3 days. Workup revealed a stroke and she was ultimately discharged with instructions for physical therapy.\par \par She continues to have right-sided weakness. \par The arm is involved more than the leg.\par This has improved since her last visit.\par It is with her daily.\par It is now mild.\par There is some associated sensory loss on the right side.\par There is no associated aphasia.\par \par She sustained a right wrist fracture in a fall.\par She followed with an orthopedist for this.\par \par \par

## 2021-08-20 ENCOUNTER — RESULT REVIEW (OUTPATIENT)
Age: 76
End: 2021-08-20

## 2021-08-20 ENCOUNTER — APPOINTMENT (OUTPATIENT)
Dept: RHEUMATOLOGY | Facility: CLINIC | Age: 76
End: 2021-08-20
Payer: MEDICAID

## 2021-08-20 VITALS
WEIGHT: 127 LBS | DIASTOLIC BLOOD PRESSURE: 62 MMHG | BODY MASS INDEX: 24.94 KG/M2 | OXYGEN SATURATION: 98 % | RESPIRATION RATE: 17 BRPM | TEMPERATURE: 97.8 F | SYSTOLIC BLOOD PRESSURE: 122 MMHG | HEART RATE: 81 BPM | HEIGHT: 60 IN

## 2021-08-20 PROCEDURE — 99072 ADDL SUPL MATRL&STAF TM PHE: CPT

## 2021-08-20 PROCEDURE — 99214 OFFICE O/P EST MOD 30 MIN: CPT

## 2021-08-20 RX ORDER — MELOXICAM 15 MG/1
TABLET ORAL
Refills: 0 | Status: DISCONTINUED | COMMUNITY
End: 2021-08-20

## 2021-08-20 RX ORDER — CYCLOBENZAPRINE HYDROCHLORIDE 5 MG/1
5 TABLET, FILM COATED ORAL
Qty: 30 | Refills: 0 | Status: DISCONTINUED | COMMUNITY
Start: 2017-12-30 | End: 2021-08-20

## 2021-08-30 NOTE — PHYSICAL EXAM
[General Appearance - Well Nourished] : well nourished [General Appearance - Well Developed] : well developed [Sclera] : the sclera and conjunctiva were normal [Hearing Threshold Finger Rub Not [place]] : hearing was normal [Respiration, Rhythm And Depth] : normal respiratory rhythm and effort [Auscultation Breath Sounds / Voice Sounds] : lungs were clear to auscultation bilaterally [Heart Sounds] : normal S1 and S2 [Nail Clubbing] : no clubbing  or cyanosis of the fingernails [Musculoskeletal - Swelling] : no joint swelling seen [Motor Tone] : muscle strength and tone were normal [] : no rash [Skin Lesions] : no skin lesions [Affect] : the affect was normal [Mood] : the mood was normal

## 2021-08-30 NOTE — ASSESSMENT
[FreeTextEntry1] : 74 yo woman with significant OP and chronic pain with LBP and dry eye/ alopecia\par \par --will check serologies, inflammatory markers, CPK and xrays of the hands \par \par

## 2021-08-30 NOTE — HISTORY OF PRESENT ILLNESS
[FreeTextEntry1] : 74 yo woman with history HTN,HLD, Stroke with residual right sided weakness, hypothyroidism and osteoporosis with multiple fracture ( wrist/compression fracture/pelvic-all considered osteoporotic fx) previously on alendronate and prolia started in 2018.  she had 4 prolia injection.  she states that she experiences severe body pain after the prolia that last about 3 days. she also had a rash with prolia.  she does not want to continue prolia.    patient had DEXA 10/2020 showing T score L1-L4 -3.0  ( from -3.6 in 2016) and femoral neck -2.6 ( stable from 2016) \par \par patient denies any smoking or alcohol. no history of RA or steroid use.  mother with history of fractures.  she has a history of hypothyroidism.  \par \par patient is taking calcium and vit d.  \par \par \par today: patient received reclast 3/16/2021.  states that she has body pain and states that she continues to have body pain months after.  she seems to have chronic body pain even prior to reclast.    patient is followed by pain management for back pain and was getting epidurals with intermittent pain relieve. she has minimal urine leakage when she cough or laughs.  she also complaints of hand pain when using hands.  has prolong morning stiffness.  No swelling of joints.  \par \par she has dry eye and stable alopecia \par no cp/sob/cough, facial rash or oral lesions.  no history of serositis or lowplts.  possible low wbc in the past??  \par \par \par

## 2021-09-03 ENCOUNTER — APPOINTMENT (OUTPATIENT)
Dept: RADIOLOGY | Facility: CLINIC | Age: 76
End: 2021-09-03
Payer: MEDICAID

## 2021-09-03 ENCOUNTER — OUTPATIENT (OUTPATIENT)
Dept: OUTPATIENT SERVICES | Facility: HOSPITAL | Age: 76
LOS: 1 days | End: 2021-09-03
Payer: COMMERCIAL

## 2021-09-03 DIAGNOSIS — M25.50 PAIN IN UNSPECIFIED JOINT: ICD-10-CM

## 2021-09-03 PROCEDURE — 73110 X-RAY EXAM OF WRIST: CPT | Mod: 26,LT

## 2021-09-03 PROCEDURE — 73130 X-RAY EXAM OF HAND: CPT

## 2021-09-03 PROCEDURE — 73110 X-RAY EXAM OF WRIST: CPT

## 2021-09-03 PROCEDURE — 73130 X-RAY EXAM OF HAND: CPT | Mod: 26,LT

## 2021-10-04 LAB
ANA SER IF-ACNC: NEGATIVE
CCP AB SER IA-ACNC: >250 UNITS
CK SERPL-CCNC: 126 U/L
CRP SERPL-MCNC: <3 MG/L
ENA SS-A AB SER IA-ACNC: <0.2 AL
ENA SS-B AB SER IA-ACNC: <0.2 AL
ERYTHROCYTE [SEDIMENTATION RATE] IN BLOOD BY WESTERGREN METHOD: 21 MM/HR
RF+CCP IGG SER-IMP: ABNORMAL
RHEUMATOID FACT SER QL: 171 IU/ML

## 2021-10-18 ENCOUNTER — APPOINTMENT (OUTPATIENT)
Dept: RHEUMATOLOGY | Facility: CLINIC | Age: 76
End: 2021-10-18
Payer: MEDICAID

## 2021-10-18 VITALS
SYSTOLIC BLOOD PRESSURE: 120 MMHG | HEART RATE: 75 BPM | HEIGHT: 60 IN | TEMPERATURE: 98 F | OXYGEN SATURATION: 98 % | DIASTOLIC BLOOD PRESSURE: 60 MMHG | RESPIRATION RATE: 17 BRPM | BODY MASS INDEX: 25.13 KG/M2 | WEIGHT: 128 LBS

## 2021-10-18 DIAGNOSIS — M25.50 PAIN IN UNSPECIFIED JOINT: ICD-10-CM

## 2021-10-18 PROCEDURE — 99072 ADDL SUPL MATRL&STAF TM PHE: CPT

## 2021-10-18 PROCEDURE — 99214 OFFICE O/P EST MOD 30 MIN: CPT

## 2021-10-18 NOTE — PHYSICAL EXAM
[General Appearance - Well Nourished] : well nourished [General Appearance - Well Developed] : well developed [Sclera] : the sclera and conjunctiva were normal [Hearing Threshold Finger Rub Not [place]] : hearing was normal [Nail Clubbing] : no clubbing  or cyanosis of the fingernails [Musculoskeletal - Swelling] : no joint swelling seen [Motor Tone] : muscle strength and tone were normal [] : no rash [Skin Lesions] : no skin lesions [Affect] : the affect was normal [Mood] : the mood was normal

## 2021-10-18 NOTE — ASSESSMENT
[FreeTextEntry1] : 74 yo woman with folllowed for osteoporosis and now most likley new onset seropositive RA.  started low prednisone with improvement \par \par --will check hep serologies and quantiferon to start MTX \par --not a candidate for plaquenil given macular degeneration \par --repaet Rf and CCP\par

## 2021-10-18 NOTE — DATA REVIEWED
[FreeTextEntry1] : CPK normal \par crp normal \par esr 21\par \par CCP>250\par negative MONE/ro/la \par \par hand xray: T old healed radial metaphyseal Fx deformity with ulnar variance.  carpal bone normal, joint space preserved and no erosions

## 2021-10-23 ENCOUNTER — LABORATORY RESULT (OUTPATIENT)
Age: 76
End: 2021-10-23

## 2021-11-03 ENCOUNTER — APPOINTMENT (OUTPATIENT)
Dept: RHEUMATOLOGY | Facility: CLINIC | Age: 76
End: 2021-11-03

## 2021-11-19 LAB
B2 GLYCOPROT1 IGA SERPL IA-ACNC: <5 SAU
B2 GLYCOPROT1 IGG SER-ACNC: <5 SGU
B2 GLYCOPROT1 IGM SER-ACNC: 5.1 SMU
CARDIOLIPIN AB SER IA-ACNC: NEGATIVE
CCP AB SER IA-ACNC: >250 UNITS
CONFIRM: 26.7 SEC
CRP SERPL-MCNC: <3 MG/L
DRVVT IMM 1:2 NP PPP: NORMAL
DRVVT SCREEN TO CONFIRM RATIO: 0.9 RATIO
ERYTHROCYTE [SEDIMENTATION RATE] IN BLOOD BY WESTERGREN METHOD: 44 MM/HR
G6PD SER-CCNC: 13.9 U/G HGB
HBV CORE IGG+IGM SER QL: REACTIVE
HBV SURFACE AB SER QL: REACTIVE
HBV SURFACE AG SER QL: NONREACTIVE
HCV AB SER QL: NONREACTIVE
HCV S/CO RATIO: 0.23 S/CO
HIV1+2 AB SPEC QL IA.RAPID: NONREACTIVE
M TB IFN-G BLD-IMP: POSITIVE
QUANTIFERON TB PLUS MITOGEN MINUS NIL: 8.27 IU/ML
QUANTIFERON TB PLUS NIL: 0.15 IU/ML
QUANTIFERON TB PLUS TB1 MINUS NIL: 0.4 IU/ML
QUANTIFERON TB PLUS TB2 MINUS NIL: 0.69 IU/ML
RF+CCP IGG SER-IMP: ABNORMAL
RHEUMATOID FACT SER QL: 127 IU/ML
SCREEN DRVVT: 32.2 SEC

## 2021-11-22 ENCOUNTER — TRANSCRIPTION ENCOUNTER (OUTPATIENT)
Age: 76
End: 2021-11-22

## 2021-12-02 ENCOUNTER — TRANSCRIPTION ENCOUNTER (OUTPATIENT)
Age: 76
End: 2021-12-02

## 2021-12-20 ENCOUNTER — APPOINTMENT (OUTPATIENT)
Dept: RHEUMATOLOGY | Facility: CLINIC | Age: 76
End: 2021-12-20
Payer: COMMERCIAL

## 2021-12-20 VITALS
DIASTOLIC BLOOD PRESSURE: 62 MMHG | SYSTOLIC BLOOD PRESSURE: 102 MMHG | HEIGHT: 60 IN | HEART RATE: 90 BPM | WEIGHT: 128 LBS | BODY MASS INDEX: 25.13 KG/M2 | RESPIRATION RATE: 17 BRPM | OXYGEN SATURATION: 98 % | TEMPERATURE: 98.1 F

## 2021-12-20 PROCEDURE — 99214 OFFICE O/P EST MOD 30 MIN: CPT

## 2021-12-20 PROCEDURE — 99072 ADDL SUPL MATRL&STAF TM PHE: CPT

## 2021-12-20 RX ORDER — ATORVASTATIN CALCIUM 40 MG/1
40 TABLET, FILM COATED ORAL
Refills: 0 | Status: DISCONTINUED | COMMUNITY
End: 2021-12-20

## 2021-12-20 NOTE — ASSESSMENT
[FreeTextEntry1] : 76 yo woman with new onset of sero positive RA sdoing better on prednisone.  patient is also noted to have positive quantiferon and positive hep b core \par \par --patient is to have CXR \par --repaet hep serologies \par --will start MTX and FA and monitor lft closely \par --taper prednisone to 2.5mg daily \par --next reclas after march 16.

## 2021-12-20 NOTE — PHYSICAL EXAM
[General Appearance - Well Nourished] : well nourished [General Appearance - Well Developed] : well developed [Sclera] : the sclera and conjunctiva were normal [Hearing Threshold Finger Rub Not [place]] : hearing was normal [Nail Clubbing] : no clubbing  or cyanosis of the fingernails [Musculoskeletal - Swelling] : no joint swelling seen [Motor Tone] : muscle strength and tone were normal [] : no rash [Skin Lesions] : no skin lesions [Affect] : the affect was normal [Mood] : the mood was normal

## 2021-12-20 NOTE — HISTORY OF PRESENT ILLNESS
[FreeTextEntry1] : 76 yo woman with history macular degeneration,  HTN, HLD, Stroke with residual right sided weakness, hypothyroidism and osteoporosis with multiple fracture ( wrist/compression fracture/pelvic-all considered osteoporotic fx) previously on alendronate and prolia started in 2018.  she had 4 prolia injection.  she states that she experiences severe body pain after the prolia that last about 3 days. she also had a rash with prolia.  she does not want to continue prolia.    patient had DEXA 10/2020 showing T score L1-L4 -3.0  ( from -3.6 in 2016) and femoral neck -2.6 ( stable from 2016) \par \par patient denies any smoking or alcohol. no history of RA or steroid use.  mother with history of fractures.  she has a history of hypothyroidism.  \par \par patient is taking calcium and vit d.  \par \par \par  patient received reclast 3/16/2021.\par \par today: patient has been on prednisone 5mg since oct.  her appointment to follow up had been canceled unfortunately.  she states that her joint pain has greatly improved.  she has positive  CCP>250 and .  patient has macular degeneration.  she is also noted to have positive quantiferon and hep b core positive.  Patient was told she had a positive PPD 40 years ago.   never reuired treatment.  denies any cough or fevers, night sweats or wt loss.  does not recall history of hep b .  she is from [place].  \par \par she has dry eye and stable alopecia \par no cp/sob/cough, facial rash or oral lesions.  no history of serositis or low plts.  possible low wbc in the past??  \par \par CPK normal \par crp normal \par esr 21\par \par CCP>250\par negative MONE/ro/la \par \par hand xray: T old healed radial metaphyseal Fx deformity with ulnar variance.  carpal bone normal, joint space preserved and no erosions \par

## 2021-12-28 ENCOUNTER — OUTPATIENT (OUTPATIENT)
Dept: OUTPATIENT SERVICES | Facility: HOSPITAL | Age: 76
LOS: 1 days | End: 2021-12-28
Payer: MEDICAID

## 2021-12-28 DIAGNOSIS — M06.9 RHEUMATOID ARTHRITIS, UNSPECIFIED: ICD-10-CM

## 2021-12-28 PROCEDURE — 71046 X-RAY EXAM CHEST 2 VIEWS: CPT | Mod: 26

## 2022-01-03 ENCOUNTER — APPOINTMENT (OUTPATIENT)
Dept: RADIOLOGY | Facility: CLINIC | Age: 77
End: 2022-01-03

## 2022-01-20 DIAGNOSIS — R91.1 SOLITARY PULMONARY NODULE: ICD-10-CM

## 2022-01-21 ENCOUNTER — APPOINTMENT (OUTPATIENT)
Dept: RHEUMATOLOGY | Facility: CLINIC | Age: 77
End: 2022-01-21
Payer: MEDICAID

## 2022-01-21 VITALS
DIASTOLIC BLOOD PRESSURE: 66 MMHG | HEIGHT: 60 IN | SYSTOLIC BLOOD PRESSURE: 124 MMHG | RESPIRATION RATE: 17 BRPM | TEMPERATURE: 98.3 F

## 2022-01-21 PROCEDURE — 99214 OFFICE O/P EST MOD 30 MIN: CPT

## 2022-01-21 PROCEDURE — 99072 ADDL SUPL MATRL&STAF TM PHE: CPT

## 2022-01-21 NOTE — ASSESSMENT
[FreeTextEntry1] : 74 yo woman with new onset of sero positive RA \par Patient is also noted to have positive quantiferon and positive hep b core\par \par Impression:\par # Seropositive non erosive RA, +ve Quantiferon gold, +ve Hep B c Ab.\par . Pt did not take MTX yet, also off steroids, CDAI today 8 (mostly based on patient global assessment).\par . Found to have +ve Quant, also has ELENA lung opacity.\par . Found ton have +ve Hep B c ab, e Ag, s Ag, PCR -ve.\par \par # OP:\par . Get annual Reclast, last dose was 3/2021.\par \par Recs: \par . Pt wants to go to [place] for 3 months, she cannot f/u in that time.\par . In view of stable joints, travel to her country for 3 months,  we will hold off on adding any DMARDs.  no able to take HCQ due to macular degeneration.   she will continue prednisone 2.5 - 5mg daily. \par --she is t o get CCT prior to leaving the country \par . Will need pulm and GI visits for +ve Quantiferon gold, lung opacity +ve Hep B c Ab when she returns. \par . Repeat DEXA scan in late 2022. \par . Will follow up when she returns, will decide further DMARDs at that time.

## 2022-01-21 NOTE — PHYSICAL EXAM
[General Appearance - Alert] : alert [General Appearance - In No Acute Distress] : in no acute distress [Sclera] : the sclera and conjunctiva were normal [PERRL With Normal Accommodation] : pupils were equal in size, round, and reactive to light [Extraocular Movements] : extraocular movements were intact [Outer Ear] : the ears and nose were normal in appearance [Oropharynx] : the oropharynx was normal [Neck Appearance] : the appearance of the neck was normal [Neck Cervical Mass (___cm)] : no neck mass was observed [Jugular Venous Distention Increased] : there was no jugular-venous distention [Thyroid Diffuse Enlargement] : the thyroid was not enlarged [Thyroid Nodule] : there were no palpable thyroid nodules [Auscultation Breath Sounds / Voice Sounds] : lungs were clear to auscultation bilaterally [Heart Rate And Rhythm] : heart rate was normal and rhythm regular [Heart Sounds] : normal S1 and S2 [Heart Sounds Gallop] : no gallops [Murmurs] : no murmurs [Heart Sounds Pericardial Friction Rub] : no pericardial rub [Bowel Sounds] : normal bowel sounds [Abdomen Soft] : soft [Abdomen Tenderness] : non-tender [Abdomen Mass (___ Cm)] : no abdominal mass palpated [] : no rash [FreeTextEntry1] : No signs of synovitis, limitation of ROM or deformity in any joint

## 2022-01-21 NOTE — REVIEW OF SYSTEMS
[Joint Pain] : joint pain [Joint Stiffness] : joint stiffness [Fever] : no fever [Chills] : no chills [Eye Pain] : no eye pain [Chest Pain] : no chest pain [Shortness Of Breath] : no shortness of breath [Cough] : no cough [Abdominal Pain] : no abdominal pain [Vomiting] : no vomiting [Diarrhea] : no diarrhea [Joint Swelling] : no joint swelling [Dizziness] : no dizziness [Anxiety] : no anxiety [Muscle Weakness] : no muscle weakness [Swollen Glands] : no swollen glands

## 2022-01-21 NOTE — HISTORY OF PRESENT ILLNESS
[FreeTextEntry1] : Interval Hx 1-:\germaine Presents today for a regular office visit. \germaine Says she feels fine.\germaine Did not start the MTX we prescribe last visit.\germaine Also stopped taking prednisone herself.\germaine Says joint pains in hands, knees, ankles are stable.\par AM stiffness for 30 minutes. \par No fever, chills, n/v/d, abdominal pain, cough, CP or SOB.

## 2022-01-27 LAB
HBV CORE IGG+IGM SER QL: REACTIVE
HBV CORE IGM SER QL: NONREACTIVE
HBV DNA # SERPL NAA+PROBE: NOT DETECTED IU/ML
HBV E AB SER QL: NONREACTIVE
HBV E AG SER QL: NONREACTIVE
HBV SURFACE AG SER QL: NONREACTIVE
HEPB DNA PCR LOG: NOT DETECTED LOG10IU/ML

## 2022-02-09 ENCOUNTER — APPOINTMENT (OUTPATIENT)
Dept: CT IMAGING | Facility: CLINIC | Age: 77
End: 2022-02-09

## 2022-03-10 ENCOUNTER — APPOINTMENT (OUTPATIENT)
Dept: CT IMAGING | Facility: CLINIC | Age: 77
End: 2022-03-10
Payer: MEDICAID

## 2022-03-10 ENCOUNTER — OUTPATIENT (OUTPATIENT)
Dept: OUTPATIENT SERVICES | Facility: HOSPITAL | Age: 77
LOS: 1 days | End: 2022-03-10
Payer: COMMERCIAL

## 2022-03-10 DIAGNOSIS — R91.1 SOLITARY PULMONARY NODULE: ICD-10-CM

## 2022-03-10 PROCEDURE — 71250 CT THORAX DX C-: CPT | Mod: 26

## 2022-03-10 PROCEDURE — 71250 CT THORAX DX C-: CPT

## 2022-03-21 ENCOUNTER — APPOINTMENT (OUTPATIENT)
Dept: RHEUMATOLOGY | Facility: CLINIC | Age: 77
End: 2022-03-21
Payer: MEDICAID

## 2022-03-21 VITALS
BODY MASS INDEX: 24.94 KG/M2 | HEIGHT: 60 IN | HEART RATE: 84 BPM | TEMPERATURE: 97.9 F | RESPIRATION RATE: 17 BRPM | SYSTOLIC BLOOD PRESSURE: 120 MMHG | WEIGHT: 127 LBS | DIASTOLIC BLOOD PRESSURE: 72 MMHG | OXYGEN SATURATION: 97 %

## 2022-03-21 DIAGNOSIS — R93.89 ABNORMAL FINDINGS ON DIAGNOSTIC IMAGING OF OTHER SPECIFIED BODY STRUCTURES: ICD-10-CM

## 2022-03-21 PROCEDURE — 99072 ADDL SUPL MATRL&STAF TM PHE: CPT

## 2022-03-21 PROCEDURE — 99214 OFFICE O/P EST MOD 30 MIN: CPT

## 2022-03-21 NOTE — PHYSICAL EXAM
[General Appearance - Well Nourished] : well nourished [General Appearance - Well Developed] : well developed [Sclera] : the sclera and conjunctiva were normal [Hearing Threshold Finger Rub Not [place]] : hearing was normal [Nail Clubbing] : no clubbing  or cyanosis of the fingernails [Musculoskeletal - Swelling] : no joint swelling seen [Motor Tone] : muscle strength and tone were normal [] : no rash [Skin Lesions] : no skin lesions [Affect] : the affect was normal [Mood] : the mood was normal

## 2022-03-21 NOTE — ASSESSMENT
[FreeTextEntry1] : 75 yo womna with osteoporosis and multiple fractures and seropositive non-erosive with positive quantiferon and hep b core antibodies \par \par --patient is to start INH and B6 for 9 months \par --she is to see GI regarding hepb status \par --she is to see pulm regarding chest CT abnormalities \par --will hold off on MTX for untill she has above eval \par --she is to cont tapering prednidone 2.5mg EOD \par --OP labs today \par --reclast ordered placed \par --next dexa 10/2022.

## 2022-03-21 NOTE — HISTORY OF PRESENT ILLNESS
[FreeTextEntry1] : 76 yo woman with history macular degeneration,  HTN, HLD, Stroke with residual right sided weakness, hypothyroidism and osteoporosis with multiple fracture ( wrist/compression fracture/pelvic-all considered osteoporotic fx) and seropositive RA \par \par osteoporosis: multiple fractures. previously on alendronate and prolia started in 2018.  she had 4 prolia injection.  she states that she experiences severe body pain after the prolia that last about 3 days. she also had a rash with prolia.  she does not want to continue prolia.    patient had DEXA 10/2020 showing T score L1-L4 -3.0  ( from -3.6 in 2016) and femoral neck -2.6 ( stable from 2016).  \par patient received reclast 3/16/2021.\par taking calcium and vit d \par \par seropositive RA : patient has macular degeneration can not start HCQ.  she is noted to be positive quantiferon and has RUL ill defines opacity on CCT.  she denies any sob or cough, wt loss , night sweats.  she is from [place] and does not reca;; having TB in the past.  she also noted to have hep b core positive.  does not recall having hepatitis. she left the country for 3 months and was not able to see GI or pulm.  she was noted started on DMARDS as she needs monitoring.  she has been on prednisone 2.5mg with good response.  she denies any joint pain or swelling.  minimal morning stiffness.    \par \par she has dry eye and stable alopecia \par no cp/sob/cough, facial rash or oral lesions.  no history of serositis or low plts.  possible low wbc in the past??  \par \par CPK normal \par crp normal \par esr 21\par \par CCP>250\par negative MONE/ro/la \par \par hand xray: T old healed radial metaphyseal Fx deformity with ulnar variance.  carpal bone normal, joint space preserved and no erosions \par CCT: ill defines linear opacity RUL most likley represent an area of scarring\par

## 2022-04-11 LAB
25(OH)D3 SERPL-MCNC: 49.2 NG/ML
ALBUMIN MFR SERPL ELPH: 61.3 %
ALBUMIN SERPL ELPH-MCNC: 4.3 G/DL
ALBUMIN SERPL-MCNC: 4 G/DL
ALBUMIN/GLOB SERPL: 1.5 RATIO
ALP BLD-CCNC: 66 U/L
ALPHA1 GLOB MFR SERPL ELPH: 3.2 %
ALPHA1 GLOB SERPL ELPH-MCNC: 0.2 G/DL
ALPHA2 GLOB MFR SERPL ELPH: 11.5 %
ALPHA2 GLOB SERPL ELPH-MCNC: 0.8 G/DL
ALT SERPL-CCNC: 19 U/L
ANION GAP SERPL CALC-SCNC: 13 MMOL/L
AST SERPL-CCNC: 30 U/L
B-GLOBULIN MFR SERPL ELPH: 11.4 %
B-GLOBULIN SERPL ELPH-MCNC: 0.8 G/DL
BASOPHILS # BLD AUTO: 0.05 K/UL
BASOPHILS NFR BLD AUTO: 1.4 %
BILIRUB SERPL-MCNC: 0.4 MG/DL
BUN SERPL-MCNC: 19 MG/DL
CALCIUM SERPL-MCNC: 8.7 MG/DL
CALCIUM SERPL-MCNC: 8.7 MG/DL
CHLORIDE SERPL-SCNC: 104 MMOL/L
CO2 SERPL-SCNC: 22 MMOL/L
CREAT SERPL-MCNC: 0.74 MG/DL
EGFR: 83 ML/MIN/1.73M2
EOSINOPHIL # BLD AUTO: 0.34 K/UL
EOSINOPHIL NFR BLD AUTO: 9.5 %
GAMMA GLOB FLD ELPH-MCNC: 0.8 G/DL
GAMMA GLOB MFR SERPL ELPH: 12.6 %
GLUCOSE SERPL-MCNC: 93 MG/DL
HCT VFR BLD CALC: 42 %
HGB BLD-MCNC: 13.4 G/DL
IMM GRANULOCYTES NFR BLD AUTO: 0.3 %
INTERPRETATION SERPL IEP-IMP: NORMAL
LYMPHOCYTES # BLD AUTO: 1.28 K/UL
LYMPHOCYTES NFR BLD AUTO: 35.9 %
MAGNESIUM SERPL-MCNC: 2.3 MG/DL
MAN DIFF?: NORMAL
MCHC RBC-ENTMCNC: 31.2 PG
MCHC RBC-ENTMCNC: 31.9 GM/DL
MCV RBC AUTO: 97.7 FL
MONOCYTES # BLD AUTO: 0.29 K/UL
MONOCYTES NFR BLD AUTO: 8.1 %
NEUTROPHILS # BLD AUTO: 1.6 K/UL
NEUTROPHILS NFR BLD AUTO: 44.8 %
PARATHYROID HORMONE INTACT: 20 PG/ML
PHOSPHATE SERPL-MCNC: 3.6 MG/DL
PLATELET # BLD AUTO: 184 K/UL
POTASSIUM SERPL-SCNC: 4.6 MMOL/L
PROT SERPL-MCNC: 6.6 G/DL
RBC # BLD: 4.3 M/UL
RBC # FLD: 12.5 %
SODIUM SERPL-SCNC: 139 MMOL/L
TSH SERPL-ACNC: 3.18 UIU/ML
WBC # FLD AUTO: 3.57 K/UL

## 2022-04-20 ENCOUNTER — APPOINTMENT (OUTPATIENT)
Dept: GASTROENTEROLOGY | Facility: CLINIC | Age: 77
End: 2022-04-20
Payer: MEDICAID

## 2022-04-20 VITALS
HEART RATE: 60 BPM | SYSTOLIC BLOOD PRESSURE: 110 MMHG | OXYGEN SATURATION: 98 % | BODY MASS INDEX: 24.94 KG/M2 | RESPIRATION RATE: 16 BRPM | TEMPERATURE: 98 F | DIASTOLIC BLOOD PRESSURE: 60 MMHG | HEIGHT: 60 IN | WEIGHT: 127 LBS

## 2022-04-20 PROCEDURE — 99205 OFFICE O/P NEW HI 60 MIN: CPT

## 2022-04-20 NOTE — PHYSICAL EXAM
[Bowel Sounds] : normal bowel sounds [Abdomen Soft] : soft [Abdomen Tenderness] : non-tender [] : no hepato-splenomegaly [Abdomen Mass (___ Cm)] : no abdominal mass palpated [Abdomen Hernia] : no hernia was discovered [Oriented To Time, Place, And Person] : oriented to person, place, and time [Affect] : the affect was normal [Scleral Icterus] : No Scleral Icterus [Hepatojugular Reflux] : patient did not have a sustained hepatojugular reflux [Spider Angioma] : No spider angioma(s) were observed [Abdominal Bruit] : no abdominal bruit [Abdominal  Ascites] : no ascites [Ascites Fluid Wave] : no ascites fluid wave [Ascites Tense] : ascites is not tense [Ascites Shifting Dullness] : no shifting dullness of ascites [FreeTextEntry1] : [place] speaking.  We used an  and she was also accompanied by her daughter who did some of the interpretation as well.

## 2022-04-20 NOTE — HISTORY OF PRESENT ILLNESS
[de-identified] : 76-year-old female patient with hypertension hyperlipidemia thyroid disorder and history of CVA presents for evaluation of isolated hepatitis B core antibody positivity.\par Her hepatitis B surface antigen negative.\par HBV viral load negative.\par LFTs within normal limits.\par Born in [place].  No prior knowledge of her core positivity or known no history of jaundice or hepatitis or knowledge of HBV infection in the past which is typically the case.\par Has been on prednisone 2.5 for her rheumatoid arthritis and was stopped approximately a month ago.  She was started on methotrexate. was also started on INH for her latent tuberculosis within the past 1 month.\par \par Denies any other history of liver disease.\par Denies hematemesis melena confusion abdominal pain abdominal distention\par

## 2022-04-20 NOTE — ASSESSMENT
[FreeTextEntry1] : Repeat LFTs and INR.\par Last hepatitis B labs were done in January.\par Repeat HBsAg, HBV DNA and LFTs today.\par Ultrasound of the liver ordered.\par Management once resulted.

## 2022-04-28 LAB
ALBUMIN SERPL ELPH-MCNC: 4.6 G/DL
ALP BLD-CCNC: 66 U/L
ALT SERPL-CCNC: 33 U/L
AST SERPL-CCNC: 29 U/L
BILIRUB DIRECT SERPL-MCNC: 0.1 MG/DL
BILIRUB INDIRECT SERPL-MCNC: 0.1 MG/DL
BILIRUB SERPL-MCNC: 0.2 MG/DL
HBV CORE IGG+IGM SER QL: REACTIVE
HBV SURFACE AG SER QL: NONREACTIVE
HEPATITIS A IGG ANTIBODY: REACTIVE
HEPB DNA PCR INT: NOT DETECTED
HEPB DNA PCR LOG: NOT DETECTED LOGIU/ML
INR PPP: 1 RATIO
PROT SERPL-MCNC: 6.9 G/DL
PT BLD: 11.6 SEC

## 2022-05-02 ENCOUNTER — APPOINTMENT (OUTPATIENT)
Dept: GASTROENTEROLOGY | Facility: CLINIC | Age: 77
End: 2022-05-02
Payer: MEDICAID

## 2022-05-02 VITALS
WEIGHT: 126 LBS | HEART RATE: 64 BPM | BODY MASS INDEX: 24.74 KG/M2 | SYSTOLIC BLOOD PRESSURE: 114 MMHG | DIASTOLIC BLOOD PRESSURE: 76 MMHG | OXYGEN SATURATION: 98 % | HEIGHT: 60 IN | RESPIRATION RATE: 14 BRPM

## 2022-05-02 PROCEDURE — 99213 OFFICE O/P EST LOW 20 MIN: CPT

## 2022-05-02 RX ORDER — PREDNISONE 2.5 MG/1
2.5 TABLET ORAL
Qty: 30 | Refills: 1 | Status: DISCONTINUED | COMMUNITY
Start: 2021-12-20 | End: 2022-05-02

## 2022-05-02 RX ORDER — PREDNISONE 5 MG/1
5 TABLET ORAL
Qty: 60 | Refills: 0 | Status: DISCONTINUED | COMMUNITY
Start: 2021-10-04 | End: 2022-05-02

## 2022-05-02 RX ORDER — ZOLEDRONIC ACID 5 MG/100ML
5 INJECTION INTRAVENOUS
Qty: 100 | Refills: 0 | Status: DISCONTINUED | COMMUNITY
Start: 2021-02-04 | End: 2022-05-02

## 2022-05-02 RX ORDER — ZOLEDRONIC ACID 5 MG/100ML
5 INJECTION INTRAVENOUS
Qty: 1 | Refills: 0 | Status: DISCONTINUED | COMMUNITY
Start: 2022-03-21 | End: 2022-05-02

## 2022-05-02 NOTE — ASSESSMENT
[FreeTextEntry1] : Repeat LFTs and INR wnl\par Repeat HBsAg negative HBV DNA undetectable.\par Discussed HBV reactivation with pt .\par This is a grey area. Can start HBV prophylaxis or monitor.\par Pt will d/w her Rhem.\par

## 2022-05-02 NOTE — HISTORY OF PRESENT ILLNESS
[de-identified] : 76-year-old female patient with hypertension hyperlipidemia thyroid disorder and history of CVA presents for evaluation of isolated hepatitis B core antibody positivity.\par Her hepatitis B surface antigen negative.\par HBV viral load negative.\par LFTs within normal limits.\par Born in [place].  No prior knowledge of her core positivity or known no history of jaundice or hepatitis or knowledge of HBV infection in the past which is typically the case.\par Has been on prednisone 2.5 for her rheumatoid arthritis and was stopped approximately a month ago.  She was started on methotrexate. was also started on INH for her latent tuberculosis within the past 1 month.\par \par Denies any other history of liver disease.\par Denies hematemesis melena confusion abdominal pain abdominal distention\par

## 2022-05-02 NOTE — PHYSICAL EXAM
[Bowel Sounds] : normal bowel sounds [Abdomen Soft] : soft [Abdomen Tenderness] : non-tender [] : no hepato-splenomegaly [Abdomen Mass (___ Cm)] : no abdominal mass palpated [Abdomen Hernia] : no hernia was discovered [Oriented To Time, Place, And Person] : oriented to person, place, and time [Affect] : the affect was normal [Scleral Icterus] : No Scleral Icterus [Hepatojugular Reflux] : patient did not have a sustained hepatojugular reflux [Spider Angioma] : No spider angioma(s) were observed [Abdominal Bruit] : no abdominal bruit [Abdominal  Ascites] : no ascites [Ascites Fluid Wave] : no ascites fluid wave [Ascites Tense] : ascites is not tense [Ascites Shifting Dullness] : no shifting dullness of ascites [FreeTextEntry1] : [place]ian speaking.  We used an  and she was also accompanied by her daughter who did some of the interpretation as well.

## 2022-05-12 ENCOUNTER — NON-APPOINTMENT (OUTPATIENT)
Age: 77
End: 2022-05-12

## 2022-05-12 ENCOUNTER — APPOINTMENT (OUTPATIENT)
Dept: PULMONOLOGY | Facility: CLINIC | Age: 77
End: 2022-05-12
Payer: MEDICAID

## 2022-05-12 VITALS
SYSTOLIC BLOOD PRESSURE: 112 MMHG | WEIGHT: 127 LBS | OXYGEN SATURATION: 97 % | RESPIRATION RATE: 16 BRPM | DIASTOLIC BLOOD PRESSURE: 72 MMHG | HEART RATE: 88 BPM | HEIGHT: 60 IN | BODY MASS INDEX: 24.94 KG/M2

## 2022-05-12 PROCEDURE — 99203 OFFICE O/P NEW LOW 30 MIN: CPT

## 2022-05-12 NOTE — PROCEDURE
[FreeTextEntry1] : [facility]\par \par EXAM: [id number] - CT CHEST - ORDERED BY: DARIA GONZALEZ\par \par \par PROCEDURE DATE: 03/10/2022\par \par \par \par INTERPRETATION: Clinical information: Evaluate left upper lobe opacity. No prior CT scans are available for comparison. Examination is compared to chest radiograph performed on 12/28/2021.\par \par CT scan of the chest was obtained without administration of intravenous contrast.\par \par Calcified lymph nodes are present in the pretracheal space and the right hilum.\par \par Heart is normal in size. Calcification the coronary arteries noted. No pericardial effusion is noted.\par \par No endobronchial lesions are noted. Ill-defined linear opacity likely representing scarring is noted in the left upper lobe. 0.5 cm nodule is noted in the right upper lobe. No pleural effusions are noted.\par \par Below the diaphragm, visualized portions of the abdomen demonstrate cholelithiasis.\par \par Visualized osseous structures are within normal limits.\par \par IMPRESSION: Ill-defined linear opacity noted in the right upper lobe most likely represents an area of scarring. The finding corresponds to the abnormality noted on the patient's chest radiograph of 12/28/2021.\par \par --- End of Report ---\par \par \par \par \par \par \par CHUY COREAS MD; Attending Radiologist\par This document has been electronically signed. Mar 14 2022 12:49PM\par \par ~~~~~~~~~~~~~~~~~~~~~~~~~~~~~~~~~~~~~~~~~~~~~~~~~~~~~~~~~~~~~~~~~~~~~~~~\par \par  Patient remains intubated, on vent  ICU Vital Signs Last 24 Hrs  T(C): 37.1 (04 May 2021 00:00), Max: 38.3 (03 May 2021 02:50)  T(F): 98.7 (04 May 2021 00:00), Max: 101 (03 May 2021 02:50)  HR: 61 (04 May 2021 02:00) (51 - 662)  BP: 123/77 (04 May 2021 02:00) (91/59 - 133/42)  BP(mean): 87 (04 May 2021 02:00) (61 - 89)  ABP: 73/41 (04 May 2021 02:00) (69/39 - 118/67)  ABP(mean): 54 (04 May 2021 02:00) (51 - 89)  RR: 17 (04 May 2021 02:00) (16 - 33)  SpO2: 99% (04 May 2021 02:00) (96% - 100%)    responds to vigorous tactile stimuli  Not opening eyes  +Following commands on right  PERRL with rightward gaze deviation  RUE/RLE antigravity  No movement on left    MEDICATIONS  (STANDING):  atorvastatin 80 milliGRAM(s) Oral at bedtime  chlorhexidine 0.12% Liquid 15 milliLiter(s) Oral Mucosa every 12 hours  heparin   Injectable 5000 Unit(s) SubCutaneous every 8 hours  levETIRAcetam  IVPB 500 milliGRAM(s) IV Intermittent every 12 hours  levothyroxine 75 MICROGram(s) Oral daily  pantoprazole   Suspension 40 milliGRAM(s) Oral daily  senna Syrup 15 milliLiter(s) Oral at bedtime  sodium chloride 2 Gram(s) Oral every 8 hours  sodium phosphate IVPB 15 milliMole(s) IV Intermittent once    MEDICATIONS  (PRN):                          8.1    7.98  )-----------( 131      ( 04 May 2021 01:15 )             26.1     05-04    144  |  116<H>  |  17  ----------------------------<  140<H>  3.6   |  19<L>  |  0.56    Ca    8.8      04 May 2021 01:15  Phos  1.9     05-04  Mg     2.2     05-04    < from: CT Head No Cont (05.03.21 @ 02:42) >    Abnormal high attenuation involving the high right posterior frontal parietal region is again identified. This is compatible with area of acute parenchymal hemorrhage. This finding measures approximately 6.9 x 4.0 cm and previously measured approximately 7.0 x 4.2 cm. Surrounding edema is again identified. Mass effect on right lateral ventricle is again seen. Right to left shift (8.0 mm) is again seen.    Acute subdural hematoma involving the right parietal-occipital region is again identified. This finding measures approximately 0.8 cm widest diameter and previously measured approximately 0.8 cm widest diameter.    Evaluation of the osseous structures with the appropriate window appears unremarkable    The visualized paranasal sinuses mastoid and middle ear regions appear clear.    IMPRESSION: Parenchymal hemorrhage and subdural hematoma again seen as described above.    < end of copied text >

## 2022-05-12 NOTE — HISTORY OF PRESENT ILLNESS
[Never] : never [TextBox_4] : 76F PMH HTN, HLD, CVA with residual R-sided weakness, hypothyroidism, macular degeneration, RA who presents for initial pulmonary evaluation. She has positive quantiferon test (10/2021) and has been on INH and B6 x 9 months for the past 1 month prior to being treated for seropositive RA. She was born in [place], came here age 49. No sick contacts. No recent travel. No night sweats or chills. No fevers. No chest pain or palpitations. No N/V/D. She denies SOB.

## 2022-05-13 ENCOUNTER — APPOINTMENT (OUTPATIENT)
Dept: RHEUMATOLOGY | Facility: CLINIC | Age: 77
End: 2022-05-13

## 2022-05-23 ENCOUNTER — APPOINTMENT (OUTPATIENT)
Dept: RHEUMATOLOGY | Facility: CLINIC | Age: 77
End: 2022-05-23
Payer: MEDICAID

## 2022-05-23 VITALS
OXYGEN SATURATION: 98 % | WEIGHT: 125 LBS | TEMPERATURE: 97.4 F | BODY MASS INDEX: 24.54 KG/M2 | HEART RATE: 92 BPM | SYSTOLIC BLOOD PRESSURE: 130 MMHG | HEIGHT: 60 IN | RESPIRATION RATE: 17 BRPM | DIASTOLIC BLOOD PRESSURE: 80 MMHG

## 2022-05-23 PROCEDURE — 99214 OFFICE O/P EST MOD 30 MIN: CPT

## 2022-05-26 NOTE — PHYSICAL EXAM
[General Appearance - Well Nourished] : well nourished [General Appearance - Well Developed] : well developed [Sclera] : the sclera and conjunctiva were normal [Hearing Threshold Finger Rub Not [place]] : hearing was normal [Nail Clubbing] : no clubbing  or cyanosis of the fingernails [Musculoskeletal - Swelling] : no joint swelling seen [Motor Tone] : muscle strength and tone were normal [] : no rash [Skin Lesions] : no skin lesions [Affect] : the affect was normal [Mood] : the mood was normal

## 2022-05-26 NOTE — ASSESSMENT
[FreeTextEntry1] : 75 yo woman with macular degeneration, HTN, HLD,hypothyroid,  stroke R sided weakness, seropositive RA , Hep B core positiuve , quantiferon positive, CCT with an opacity that will be monitored by pulm ,OP with multiple fractures.  NOw on INH with B6, monitoring hep B VL , on MTX and FA and doing better CDAI 10 \par \par --her health insurance denied reclast.  her last dose was 3/2021\par --patient is on calcium  and vitamin D \par --next dexa 10/2023\par --quantiferon positive: 9 months of INH and vit B6 \par --hep b core positive: hep b VL every 3 months \par --abnormal CCT: follow up with pulm, repeat CCT 3/2023\par --RA cont MTX with FA

## 2022-05-26 NOTE — HISTORY OF PRESENT ILLNESS
[FreeTextEntry1] : 74 yo woman with history macular degeneration,  HTN, HLD, Stroke with residual right sided weakness, hypothyroidism and osteoporosis with multiple fracture ( wrist/compression fracture/pelvic-all considered osteoporotic fx) and seropositive RA \par \par osteoporosis: multiple fractures. previously on alendronate and prolia started in 2018.  she had 4 prolia injection.  she states that she experiences severe body pain after the prolia that last about 3 days. she also had a rash with prolia.  she does not want to continue prolia.    patient had DEXA 10/2020 showing T score L1-L4 -3.0  ( from -3.6 in 2016) and femoral neck -2.6 ( stable from 2016).  \par patient received reclast 3/16/2021.\par taking calcium and vit d \par last dexa 10/2022\par \par seropositive RA : patient has macular degeneration can not start HCQ.  she is noted to be positive quantiferon and has RUL ill defines opacity on CCT.  she denies any sob or cough, wt loss , night sweats. She is on INH and vit b6.  she saw pulm and will have a repeat CCT 3/2023. she also noted to have hep b core positive. Saw GI and note states that she can either consider ppx or monitor VL.   \par \par Patient stopped prednisone 2 months ago.  she has been on MTX 4 tabs weekly with fa.  states that she is not having swelling and feels better.  \par \par she has dry eye and stable alopecia \par no cp/sob/cough, facial rash or oral lesions.  no history of serositis or low plts.  possible low wbc in the past??  \par \par \par \par \par CCP>250\par negative MONE/ro/la \par CPK normal \par crp normal \par esr 21\par \par hand xray: T old healed radial metaphyseal Fx deformity with ulnar variance.  carpal bone normal, joint space preserved and no erosions \par CCT: ill defines linear opacity RUL most likley represent an area of scarring\par

## 2022-06-08 LAB
25(OH)D3 SERPL-MCNC: 51.1 NG/ML
ALBUMIN SERPL ELPH-MCNC: 4.5 G/DL
ALP BLD-CCNC: 55 U/L
ALT SERPL-CCNC: 39 U/L
ANION GAP SERPL CALC-SCNC: 13 MMOL/L
AST SERPL-CCNC: 37 U/L
BASOPHILS # BLD AUTO: 0.06 K/UL
BASOPHILS NFR BLD AUTO: 1.5 %
BILIRUB SERPL-MCNC: 0.4 MG/DL
BUN SERPL-MCNC: 16 MG/DL
CALCIUM SERPL-MCNC: 9 MG/DL
CALCIUM SERPL-MCNC: 9 MG/DL
CHLORIDE SERPL-SCNC: 103 MMOL/L
CO2 SERPL-SCNC: 24 MMOL/L
CREAT SERPL-MCNC: 0.7 MG/DL
CRP SERPL-MCNC: <3 MG/L
EGFR: 89 ML/MIN/1.73M2
EOSINOPHIL # BLD AUTO: 0.38 K/UL
EOSINOPHIL NFR BLD AUTO: 9.5 %
ERYTHROCYTE [SEDIMENTATION RATE] IN BLOOD BY WESTERGREN METHOD: 29 MM/HR
GLUCOSE SERPL-MCNC: 93 MG/DL
HBV SURFACE AB SER QL: REACTIVE
HBV SURFACE AG SER QL: NONREACTIVE
HCT VFR BLD CALC: 37.2 %
HEPB DNA PCR INT: NOT DETECTED
HEPB DNA PCR LOG: NOT DETECTED LOGIU/ML
HGB BLD-MCNC: 12.3 G/DL
IMM GRANULOCYTES NFR BLD AUTO: 0.3 %
LYMPHOCYTES # BLD AUTO: 1.48 K/UL
LYMPHOCYTES NFR BLD AUTO: 37.2 %
MAGNESIUM SERPL-MCNC: 2.2 MG/DL
MAN DIFF?: NORMAL
MCHC RBC-ENTMCNC: 32.4 PG
MCHC RBC-ENTMCNC: 33.1 GM/DL
MCV RBC AUTO: 97.9 FL
MONOCYTES # BLD AUTO: 0.32 K/UL
MONOCYTES NFR BLD AUTO: 8 %
NEUTROPHILS # BLD AUTO: 1.73 K/UL
NEUTROPHILS NFR BLD AUTO: 43.5 %
PARATHYROID HORMONE INTACT: 15 PG/ML
PHOSPHATE SERPL-MCNC: 3.8 MG/DL
PLATELET # BLD AUTO: 197 K/UL
POTASSIUM SERPL-SCNC: 4.7 MMOL/L
PROT SERPL-MCNC: 6.3 G/DL
RBC # BLD: 3.8 M/UL
RBC # FLD: 12.8 %
SODIUM SERPL-SCNC: 140 MMOL/L
TSH SERPL-ACNC: 2.9 UIU/ML
WBC # FLD AUTO: 3.98 K/UL

## 2022-06-21 ENCOUNTER — APPOINTMENT (OUTPATIENT)
Dept: RHEUMATOLOGY | Facility: CLINIC | Age: 77
End: 2022-06-21

## 2022-06-21 VITALS
TEMPERATURE: 97 F | SYSTOLIC BLOOD PRESSURE: 133 MMHG | OXYGEN SATURATION: 97 % | HEART RATE: 72 BPM | RESPIRATION RATE: 17 BRPM | DIASTOLIC BLOOD PRESSURE: 77 MMHG

## 2022-06-21 VITALS
RESPIRATION RATE: 17 BRPM | SYSTOLIC BLOOD PRESSURE: 129 MMHG | DIASTOLIC BLOOD PRESSURE: 66 MMHG | OXYGEN SATURATION: 99 % | HEART RATE: 62 BPM

## 2022-06-21 PROCEDURE — 96374 THER/PROPH/DIAG INJ IV PUSH: CPT | Mod: 59

## 2022-06-21 RX ORDER — ZOLEDRONIC ACID 5 MG/100ML
5 INJECTION INTRAVENOUS
Qty: 0 | Refills: 0 | Status: COMPLETED | OUTPATIENT
Start: 2022-06-15

## 2022-09-19 ENCOUNTER — APPOINTMENT (OUTPATIENT)
Dept: RHEUMATOLOGY | Facility: CLINIC | Age: 77
End: 2022-09-19

## 2022-09-19 VITALS
DIASTOLIC BLOOD PRESSURE: 60 MMHG | HEIGHT: 60 IN | TEMPERATURE: 98 F | HEART RATE: 76 BPM | OXYGEN SATURATION: 98 % | SYSTOLIC BLOOD PRESSURE: 120 MMHG

## 2022-09-19 PROCEDURE — 99214 OFFICE O/P EST MOD 30 MIN: CPT

## 2022-09-19 NOTE — PHYSICAL EXAM
[General Appearance - Well Nourished] : well nourished [General Appearance - Well Developed] : well developed [Sclera] : the sclera and conjunctiva were normal [Hearing Threshold Finger Rub Not [place]] : hearing was normal [Nail Clubbing] : no clubbing  or cyanosis of the fingernails [Musculoskeletal - Swelling] : no joint swelling seen [Motor Tone] : muscle strength and tone were normal [] : no rash [Skin Lesions] : no skin lesions [Affect] : the affect was normal [Mood] : the mood was normal

## 2022-09-19 NOTE — HISTORY OF PRESENT ILLNESS
[FreeTextEntry1] : 75 yo woman with history macular degeneration,  HTN, HLD, Stroke with residual right sided weakness, hypothyroidism and osteoporosis with multiple fracture ( wrist/compression fracture/pelvic-all considered osteoporotic fx) and seropositive RA \par \par osteoporosis: multiple fractures. previously on alendronate and prolia started in 2018.  she had 4 prolia injection.  she states that she experiences severe body pain after the prolia that last about 3 days. she also had a rash with prolia.  she does not want to continue prolia.    patient had DEXA 10/2020 showing T score L1-L4 -3.0  ( from -3.6 in 2016) and femoral neck -2.6 ( stable from 2016).  \par patient received reclast 3/16/2021, 6/21/2022\par taking calcium and vit d \par last dexa 10/2020\par \par seropositive RA : patient has macular degeneration can not start HCQ.  she is noted to be positive quantiferon and has RUL ill defines opacity on CCT.  she denies any sob or cough, wt loss , night sweats. She is on INH and vit b6. she saw pulm and will have a repeat CCT 3/2023. she also noted to have hep b core positive. Saw GI and note states that she can either consider ppx or monitor VL.   \par \par today: patient is on MTX and FA and denies any joint swelling.  she has minimal morning stiffness on some days.  she is on calcium and vit d for OP. she had reclast 6/2020 and tolerated this well.  she remains on INH and B6.  \par \par she has dry eye and stable alopecia \par no cp/sob/cough, facial rash or oral lesions.  no history of serositis or low plts.  possible low wbc in the past??  \par \par \par CCP>250\par negative MONE/ro/la \par CPK normal \par crp normal \par esr 21\par \par hand xray: T old healed radial metaphyseal Fx deformity with ulnar variance.  carpal bone normal, joint space preserved and no erosions \par CCT: ill defines linear opacity RUL most likley represent an area of scarring\par

## 2022-09-19 NOTE — ASSESSMENT
[FreeTextEntry1] : 77 yo woman with macular degeneration, HTN, HLD,hypothyroid,  stroke R sided weakness, seropositive RA , Hep B core positiuve , quantiferon positive, CCT with an opacity that will be monitored by pulm ,OP with multiple fractures.  NOw on INH with B6, monitoring hep B VL , on MTX and FA and doing better CDAI 6 \par \par --Reclast last given 6/21/2022.  will need dexa on next visit ( last done 10/2020) \par --cont calcium  and vitamin D \par --quantiferon positive: 9 months of INH and vit B6 \par --hep b core positive: hep b VL every 3 months \par --abnormal CCT: follow up with pulm, repeat CCT 3/2023\par --RA cont MTX with FA

## 2022-10-18 ENCOUNTER — APPOINTMENT (OUTPATIENT)
Dept: NEUROLOGY | Facility: CLINIC | Age: 77
End: 2022-10-18

## 2022-10-18 ENCOUNTER — NON-APPOINTMENT (OUTPATIENT)
Age: 77
End: 2022-10-18

## 2022-10-18 VITALS
DIASTOLIC BLOOD PRESSURE: 68 MMHG | HEIGHT: 60 IN | BODY MASS INDEX: 24.54 KG/M2 | SYSTOLIC BLOOD PRESSURE: 126 MMHG | WEIGHT: 125 LBS

## 2022-10-18 PROCEDURE — 99213 OFFICE O/P EST LOW 20 MIN: CPT

## 2022-10-18 RX ORDER — LEVOTHYROXINE SODIUM 0.03 MG/1
25 TABLET ORAL
Qty: 30 | Refills: 0 | Status: COMPLETED | COMMUNITY
Start: 2018-01-20 | End: 2022-10-18

## 2022-10-18 RX ORDER — LEVOTHYROXINE SODIUM 0.05 MG/1
50 TABLET ORAL
Qty: 30 | Refills: 0 | Status: ACTIVE | COMMUNITY
Start: 2022-06-29

## 2022-10-18 NOTE — ASSESSMENT
[FreeTextEntry1] : This is a 76 year-old woman who is status post stroke.\par Her examination remains stable. \par I have recommended she continue antiplatelet and statin medication.\par \par Her blood pressure is well controlled on her current regimen. \par \par I will see her back in one year.

## 2022-10-18 NOTE — DATA REVIEWED
[de-identified] : The patient's daughter brought a page from her CT scan from [place]. \par The study demonstrated a small infarct in the left basal ganglia/internal capsule.\par \par Carotid ultrasounds and transcranial Dopplers were unremarkable in my office on 1/18/18.

## 2022-10-18 NOTE — HISTORY OF PRESENT ILLNESS
[FreeTextEntry1] : I saw this patient in the office today.\par \par As you recall, on 9/24/17 she developed right-sided weakness. This lasted about 15 minutes and resolved. She had a second episode a few hours later which also resolved. A few hours after that she has her episode at which persisted. She was in [place] at the time. She was taken to a hospital where she spent 3 days. Workup revealed a stroke and she was ultimately discharged with instructions for physical therapy.\par \par She continues to have right-sided weakness. \par The arm is involved more than the leg.\par This has improved since her last visit.\par It is with her daily.\par It is now mild.\par There is some associated sensory loss on the right side.\par There is no associated aphasia.\par \par \par \par

## 2022-10-18 NOTE — PHYSICAL EXAM
[General Appearance - Alert] : alert [General Appearance - In No Acute Distress] : in no acute distress [Oriented To Time, Place, And Person] : oriented to person, place, and time [Affect] : the affect was normal [Memory Recent] : recent memory was not impaired [Memory Remote] : remote memory was not impaired [Cranial Nerves Optic (II)] : visual acuity intact bilaterally,  visual fields full to confrontation, pupils equal round and reactive to light [Cranial Nerves Oculomotor (III)] : extraocular motion intact [Cranial Nerves Trigeminal (V)] : facial sensation intact symmetrically [Cranial Nerves Facial (VII)] : face symmetrical [Cranial Nerves Vestibulocochlear (VIII)] : hearing was intact bilaterally [Cranial Nerves Accessory (XI - Cranial And Spinal)] : head turning and shoulder shrug symmetric [Cranial Nerves Glossopharyngeal (IX)] : tongue and palate midline [Cranial Nerves Hypoglossal (XII)] : there was no tongue deviation with protrusion [Motor Tone] : muscle tone was normal in all four extremities [3+] : Patella right 3+ [2+] : Patella left 2+ [Plantar Reflex Right Only] : abnormal on the right [Optic Disc Abnormality] : the optic disc were normal in size and color [Edema] : there was no peripheral edema [Involuntary Movements] : no involuntary movements were seen [Aphasia] : no dysphasia/aphasia [Coordination - Dysmetria Impaired Finger-to-Nose Left Only] : not present on the left side [Plantar Reflex Left Only] : normal on the left [FreeTextEntry7] : There is slightly diminished sensation to light touch, pin, and vibration throughout the right side. [FreeTextEntry8] : Gait is broad-based and she requires a cane for ambulation. Balance is poor.

## 2022-10-21 ENCOUNTER — INPATIENT (INPATIENT)
Facility: HOSPITAL | Age: 77
LOS: 1 days | Discharge: HOME | DRG: 343 | End: 2022-10-23
Attending: SURGERY | Admitting: SURGERY
Payer: COMMERCIAL

## 2022-10-21 ENCOUNTER — TRANSCRIPTION ENCOUNTER (OUTPATIENT)
Age: 77
End: 2022-10-21

## 2022-10-21 ENCOUNTER — RESULT REVIEW (OUTPATIENT)
Age: 77
End: 2022-10-21

## 2022-10-21 VITALS
WEIGHT: 123.9 LBS | RESPIRATION RATE: 20 BRPM | TEMPERATURE: 98 F | HEART RATE: 111 BPM | OXYGEN SATURATION: 98 % | SYSTOLIC BLOOD PRESSURE: 121 MMHG | HEIGHT: 60 IN | DIASTOLIC BLOOD PRESSURE: 62 MMHG

## 2022-10-21 DIAGNOSIS — K35.80 UNSPECIFIED ACUTE APPENDICITIS: ICD-10-CM

## 2022-10-21 LAB
ALBUMIN SERPL ELPH-MCNC: 4.2 G/DL — SIGNIFICANT CHANGE UP (ref 3.3–5.2)
ALP SERPL-CCNC: 90 U/L — SIGNIFICANT CHANGE UP (ref 40–120)
ALT FLD-CCNC: 27 U/L — SIGNIFICANT CHANGE UP
ANION GAP SERPL CALC-SCNC: 12 MMOL/L — SIGNIFICANT CHANGE UP (ref 5–17)
APPEARANCE UR: CLEAR — SIGNIFICANT CHANGE UP
AST SERPL-CCNC: 30 U/L — SIGNIFICANT CHANGE UP
BASE EXCESS BLDV CALC-SCNC: -1.4 MMOL/L — SIGNIFICANT CHANGE UP (ref -2–3)
BASOPHILS # BLD AUTO: 0.04 K/UL — SIGNIFICANT CHANGE UP (ref 0–0.2)
BASOPHILS NFR BLD AUTO: 0.3 % — SIGNIFICANT CHANGE UP (ref 0–2)
BILIRUB SERPL-MCNC: 0.5 MG/DL — SIGNIFICANT CHANGE UP (ref 0.4–2)
BILIRUB UR-MCNC: NEGATIVE — SIGNIFICANT CHANGE UP
BLD GP AB SCN SERPL QL: SIGNIFICANT CHANGE UP
BUN SERPL-MCNC: 13.1 MG/DL — SIGNIFICANT CHANGE UP (ref 8–20)
CA-I SERPL-SCNC: 1.06 MMOL/L — LOW (ref 1.15–1.33)
CALCIUM SERPL-MCNC: 8.7 MG/DL — SIGNIFICANT CHANGE UP (ref 8.4–10.5)
CHLORIDE BLDV-SCNC: 101 MMOL/L — SIGNIFICANT CHANGE UP (ref 98–107)
CHLORIDE SERPL-SCNC: 100 MMOL/L — SIGNIFICANT CHANGE UP (ref 98–107)
CO2 SERPL-SCNC: 23 MMOL/L — SIGNIFICANT CHANGE UP (ref 22–29)
COLOR SPEC: YELLOW — SIGNIFICANT CHANGE UP
CREAT SERPL-MCNC: 0.5 MG/DL — SIGNIFICANT CHANGE UP (ref 0.5–1.3)
DIFF PNL FLD: ABNORMAL
EGFR: 97 ML/MIN/1.73M2 — SIGNIFICANT CHANGE UP
EOSINOPHIL # BLD AUTO: 0 K/UL — SIGNIFICANT CHANGE UP (ref 0–0.5)
EOSINOPHIL NFR BLD AUTO: 0 % — SIGNIFICANT CHANGE UP (ref 0–6)
EPI CELLS # UR: SIGNIFICANT CHANGE UP
FLUAV AG NPH QL: SIGNIFICANT CHANGE UP
FLUBV AG NPH QL: SIGNIFICANT CHANGE UP
GAS PNL BLDV: 133 MMOL/L — LOW (ref 136–145)
GAS PNL BLDV: SIGNIFICANT CHANGE UP
GAS PNL BLDV: SIGNIFICANT CHANGE UP
GLUCOSE BLDV-MCNC: 94 MG/DL — SIGNIFICANT CHANGE UP (ref 70–99)
GLUCOSE SERPL-MCNC: 101 MG/DL — HIGH (ref 70–99)
GLUCOSE UR QL: NEGATIVE MG/DL — SIGNIFICANT CHANGE UP
HCO3 BLDV-SCNC: 24 MMOL/L — SIGNIFICANT CHANGE UP (ref 22–29)
HCT VFR BLD CALC: 37.9 % — SIGNIFICANT CHANGE UP (ref 34.5–45)
HGB BLD-MCNC: 13.1 G/DL — SIGNIFICANT CHANGE UP (ref 11.5–15.5)
IMM GRANULOCYTES NFR BLD AUTO: 0.3 % — SIGNIFICANT CHANGE UP (ref 0–0.9)
KETONES UR-MCNC: ABNORMAL
LACTATE BLDV-MCNC: 1.1 MMOL/L — SIGNIFICANT CHANGE UP (ref 0.5–2)
LEUKOCYTE ESTERASE UR-ACNC: ABNORMAL
LIDOCAIN IGE QN: 27 U/L — SIGNIFICANT CHANGE UP (ref 22–51)
LYMPHOCYTES # BLD AUTO: 0.79 K/UL — LOW (ref 1–3.3)
LYMPHOCYTES # BLD AUTO: 6.7 % — LOW (ref 13–44)
MCHC RBC-ENTMCNC: 32.6 PG — SIGNIFICANT CHANGE UP (ref 27–34)
MCHC RBC-ENTMCNC: 34.6 GM/DL — SIGNIFICANT CHANGE UP (ref 32–36)
MCV RBC AUTO: 94.3 FL — SIGNIFICANT CHANGE UP (ref 80–100)
MONOCYTES # BLD AUTO: 0.47 K/UL — SIGNIFICANT CHANGE UP (ref 0–0.9)
MONOCYTES NFR BLD AUTO: 4 % — SIGNIFICANT CHANGE UP (ref 2–14)
NEUTROPHILS # BLD AUTO: 10.47 K/UL — HIGH (ref 1.8–7.4)
NEUTROPHILS NFR BLD AUTO: 88.7 % — HIGH (ref 43–77)
NITRITE UR-MCNC: NEGATIVE — SIGNIFICANT CHANGE UP
PCO2 BLDV: 39 MMHG — SIGNIFICANT CHANGE UP (ref 39–42)
PH BLDV: 7.39 — SIGNIFICANT CHANGE UP (ref 7.32–7.43)
PH UR: 7 — SIGNIFICANT CHANGE UP (ref 5–8)
PLATELET # BLD AUTO: 219 K/UL — SIGNIFICANT CHANGE UP (ref 150–400)
PO2 BLDV: 57 MMHG — HIGH (ref 25–45)
POTASSIUM BLDV-SCNC: 4 MMOL/L — SIGNIFICANT CHANGE UP (ref 3.5–5.1)
POTASSIUM SERPL-MCNC: 4.5 MMOL/L — SIGNIFICANT CHANGE UP (ref 3.5–5.3)
POTASSIUM SERPL-SCNC: 4.5 MMOL/L — SIGNIFICANT CHANGE UP (ref 3.5–5.3)
PROT SERPL-MCNC: 7 G/DL — SIGNIFICANT CHANGE UP (ref 6.6–8.7)
PROT UR-MCNC: NEGATIVE — SIGNIFICANT CHANGE UP
RBC # BLD: 4.02 M/UL — SIGNIFICANT CHANGE UP (ref 3.8–5.2)
RBC # FLD: 12.1 % — SIGNIFICANT CHANGE UP (ref 10.3–14.5)
RBC CASTS # UR COMP ASSIST: SIGNIFICANT CHANGE UP /HPF (ref 0–4)
RSV RNA NPH QL NAA+NON-PROBE: SIGNIFICANT CHANGE UP
SAO2 % BLDV: 89.3 % — SIGNIFICANT CHANGE UP
SARS-COV-2 RNA SPEC QL NAA+PROBE: SIGNIFICANT CHANGE UP
SODIUM SERPL-SCNC: 135 MMOL/L — SIGNIFICANT CHANGE UP (ref 135–145)
SP GR SPEC: 1 — LOW (ref 1.01–1.02)
UROBILINOGEN FLD QL: NEGATIVE MG/DL — SIGNIFICANT CHANGE UP
WBC # BLD: 11.81 K/UL — HIGH (ref 3.8–10.5)
WBC # FLD AUTO: 11.81 K/UL — HIGH (ref 3.8–10.5)
WBC UR QL: SIGNIFICANT CHANGE UP /HPF (ref 0–5)

## 2022-10-21 PROCEDURE — 99285 EMERGENCY DEPT VISIT HI MDM: CPT

## 2022-10-21 PROCEDURE — 88304 TISSUE EXAM BY PATHOLOGIST: CPT | Mod: 26

## 2022-10-21 PROCEDURE — 74177 CT ABD & PELVIS W/CONTRAST: CPT | Mod: 26,MA

## 2022-10-21 RX ORDER — CEFOTETAN DISODIUM 1 G
2 VIAL (EA) INJECTION ONCE
Refills: 0 | Status: DISCONTINUED | OUTPATIENT
Start: 2022-10-21 | End: 2022-10-23

## 2022-10-21 RX ORDER — CEFOTETAN DISODIUM 1 G
2 VIAL (EA) INJECTION ONCE
Refills: 0 | Status: COMPLETED | OUTPATIENT
Start: 2022-10-21 | End: 2022-10-21

## 2022-10-21 RX ORDER — ACETAMINOPHEN 500 MG
650 TABLET ORAL EVERY 6 HOURS
Refills: 0 | Status: DISCONTINUED | OUTPATIENT
Start: 2022-10-21 | End: 2022-10-22

## 2022-10-21 RX ORDER — SODIUM CHLORIDE 9 MG/ML
1000 INJECTION INTRAMUSCULAR; INTRAVENOUS; SUBCUTANEOUS ONCE
Refills: 0 | Status: COMPLETED | OUTPATIENT
Start: 2022-10-21 | End: 2022-10-21

## 2022-10-21 RX ORDER — SODIUM CHLORIDE 9 MG/ML
1000 INJECTION, SOLUTION INTRAVENOUS
Refills: 0 | Status: DISCONTINUED | OUTPATIENT
Start: 2022-10-21 | End: 2022-10-22

## 2022-10-21 RX ORDER — MORPHINE SULFATE 50 MG/1
4 CAPSULE, EXTENDED RELEASE ORAL ONCE
Refills: 0 | Status: COMPLETED | OUTPATIENT
Start: 2022-10-21 | End: 2022-10-21

## 2022-10-21 RX ORDER — ONDANSETRON 8 MG/1
4 TABLET, FILM COATED ORAL ONCE
Refills: 0 | Status: DISCONTINUED | OUTPATIENT
Start: 2022-10-21 | End: 2022-10-23

## 2022-10-21 RX ADMIN — Medication 2 GRAM(S): at 22:15

## 2022-10-21 RX ADMIN — SODIUM CHLORIDE 1000 MILLILITER(S): 9 INJECTION INTRAMUSCULAR; INTRAVENOUS; SUBCUTANEOUS at 21:29

## 2022-10-21 RX ADMIN — SODIUM CHLORIDE 100 MILLILITER(S): 9 INJECTION, SOLUTION INTRAVENOUS at 20:42

## 2022-10-21 RX ADMIN — SODIUM CHLORIDE 1000 MILLILITER(S): 9 INJECTION INTRAMUSCULAR; INTRAVENOUS; SUBCUTANEOUS at 20:42

## 2022-10-21 RX ADMIN — Medication 100 GRAM(S): at 21:18

## 2022-10-21 NOTE — H&P ADULT - ASSESSMENT
76F presents with 1 day history of abdominal pain with associated nausea and is tender to palpation in the RLQ. On presentation there is a mild leukocytosis of 11.8 and CT abdomen/pelvis demonstrates a dilated appendix with diameter of 11mm with associated wall enhancement. Clinical picture is consistent with acute appendicitis.    Plan:  -admit to surgery  -plan for appendectomy  -NPO/IVF  -cefotetan 2g  -IS/pulm toilet  -continue to monitor VS

## 2022-10-21 NOTE — H&P ADULT - NSHPPHYSICALEXAM_GEN_ALL_CORE
PHYSICAL EXAM:      Constitutional: NAD, resting comfortably  Eyes: anicteric, PERRL  ENMT: MMM  Neck: supple, FROM  Respiratory: respirations even, unlabored on room air  Cardiovascular: RRR  Gastrointestinal: TTP RLQ; RLQ pain on palpation of LLQ; soft, non-distended  Extremities: WWP  Neuro: A&O x4

## 2022-10-21 NOTE — ED ADULT TRIAGE NOTE - BP NONINVASIVE DIASTOLIC (MM HG)
Tell the patient that she can go ahead and take her Ajovy shot today.  I have also sent in a prescription of Imitrex subcu shots.  She can take 1 shot today and repeated in 3 hours if the headache comes back.  Cannot take over 2 shots a day.  Also let her know that she should stop the oral contraceptive pill and switch to a different 1   62

## 2022-10-21 NOTE — H&P ADULT - NSHPLABSRESULTS_GEN_ALL_CORE
13.1   11.81 )-----------( 219      ( 21 Oct 2022 17:32 )             37.9   10-21    135  |  100  |  13.1  ----------------------------<  101<H>  4.5   |  23.0  |  0.50    Ca    8.7      21 Oct 2022 17:32    TPro  7.0  /  Alb  4.2  /  TBili  0.5  /  DBili  x   /  AST  30  /  ALT  27  /  AlkPhos  90  10-21

## 2022-10-21 NOTE — H&P ADULT - NSHPREVIEWOFSYSTEMS_GEN_ALL_CORE
REVIEW OF SYSTEMS:    CONSTITUTIONAL:  No weakness, fevers or chills  EYES/ENT:  No visual changes;  No vertigo or throat pain   NECK:  No pain or stiffness  RESPIRATORY:  No cough, wheezing, hemoptysis; No shortness of breath  CARDIOVASCULAR:  No chest pain or palpitations  GASTROINTESTINAL:  sharp abdominal pain in RLQ, diffuse crampy abdominal pain  GENITOURINARY:  No dysuria, frequency or hematuria  MUSCULOSKELETAL:  FROM all extremities, normal strength, No calf tenderness  NEUROLOGICAL:  No numbness or weakness  SKIN:  No itching, rashes

## 2022-10-21 NOTE — ED PROVIDER NOTE - OBJECTIVE STATEMENT
76yF with pmh htn, hld, gallstones/mass? presenting with abd pain. Patient reports that the pain started yesterday after eating. Reports pain is constant and in the RLQ. Pain is associated with nausea but no vomiting or diarrhea. She denies h/o abd surgery, BRBPR, melena. Last bowel movement was yesterday. She follows with a general surgeon for her gallbladder issues. Denies fevers, chills, dysuria, chest pain, and dyspnea.

## 2022-10-21 NOTE — ED PROVIDER NOTE - PHYSICAL EXAMINATION
Gen: no acute distress  Head: normocephalic, atraumatic  EENT: EOMI  Lung: no increased work of breathing  CV: 2+ radial pulses b/l, no LE edema  Abd: soft, +TTP in RLQ; no rebound tenderness or guarding, no distention; no CVA tenderness  MSK: no visible deformities, full range of motion in all 4 extremities  Neuro: A&Ox4; No focal neurologic deficits

## 2022-10-21 NOTE — H&P ADULT - NSICDXPASTMEDICALHX_GEN_ALL_CORE_FT
PAST MEDICAL HISTORY:  High cholesterol     Hypertension     Hypothyroidism     Osteoporosis     Stroke x1 2017

## 2022-10-21 NOTE — ED PROVIDER NOTE - CLINICAL SUMMARY MEDICAL DECISION MAKING FREE TEXT BOX
76yF with pmh htn, hld, gallstones/mass? presenting with abd pain. 76yF with pmh htn, hld, gallstones/mass? presenting with abd pain. With RLQ>RUQ abd pain. Labs, CT A/P, pain control

## 2022-10-21 NOTE — H&P ADULT - HISTORY OF PRESENT ILLNESS
77 yo F with PMH of HTN, HLD, hypothyroidism and no past surgical history presents to the ED with 1 day of acute onset abdominal pain. She reports pain began yesterday evening, without precipitating event, and is associated with nausea though no emesis. Pain is new and patient denies experiencing similar pain in the past, though does endorse known history of gallstones. She indicates pain began periumbilically and locates the majority of pain now in the RLQ. Last BM yesterday, continues to pass flatus. Denies subjective fevers or chills.

## 2022-10-21 NOTE — ED PROVIDER NOTE - ATTENDING CONTRIBUTION TO CARE
Bobo: I performed a face to face evaluation of this patient and performed a full history and physical examination on the patient.  I agree with the resident's history, physical examination, and plan of the patient unless otherwise noted. My brief assessment is as follows: hx htn, hld, gallstones?/mass c/o abd pain on right side abd starting after eating yesterday associated with nausea. pain generalized abd, more right, and most rlq. no vomiting/diarrhea/fever/urinary symptoms. no cp/sob. no other complaints. non toxic, well appearing, ctab, rrr, abd soft, with ttp right side abd, more rlq though some ruq, neg murphys. no swelling b/l LE. labs, ct, symptom control, reassess.

## 2022-10-21 NOTE — ED ADULT NURSE REASSESSMENT NOTE - NS ED NURSE REASSESS COMMENT FT1
Assumed care of pt at 19:15 as stated in report from PRASANTH Bartholomew Charting as noted. Patient A&O x4, pt denies pain/discomfort, denies CP/SOB. Updated on the plan of care. Call bell within reach, bed locked in lowest position. IV site flushed w/ NS. No redness, swelling or pain noted to site. No signs of acute distress noted, safety maintained. Pt seen by surgery,

## 2022-10-22 LAB
ANION GAP SERPL CALC-SCNC: 12 MMOL/L — SIGNIFICANT CHANGE UP (ref 5–17)
BASOPHILS # BLD AUTO: 0.02 K/UL — SIGNIFICANT CHANGE UP (ref 0–0.2)
BASOPHILS NFR BLD AUTO: 0.2 % — SIGNIFICANT CHANGE UP (ref 0–2)
BUN SERPL-MCNC: 8.6 MG/DL — SIGNIFICANT CHANGE UP (ref 8–20)
CALCIUM SERPL-MCNC: 7.7 MG/DL — LOW (ref 8.4–10.5)
CHLORIDE SERPL-SCNC: 104 MMOL/L — SIGNIFICANT CHANGE UP (ref 98–107)
CO2 SERPL-SCNC: 21 MMOL/L — LOW (ref 22–29)
CREAT SERPL-MCNC: 0.63 MG/DL — SIGNIFICANT CHANGE UP (ref 0.5–1.3)
EGFR: 91 ML/MIN/1.73M2 — SIGNIFICANT CHANGE UP
EOSINOPHIL # BLD AUTO: 0 K/UL — SIGNIFICANT CHANGE UP (ref 0–0.5)
EOSINOPHIL NFR BLD AUTO: 0 % — SIGNIFICANT CHANGE UP (ref 0–6)
GLUCOSE SERPL-MCNC: 138 MG/DL — HIGH (ref 70–99)
HCT VFR BLD CALC: 31 % — LOW (ref 34.5–45)
HGB BLD-MCNC: 10.6 G/DL — LOW (ref 11.5–15.5)
IMM GRANULOCYTES NFR BLD AUTO: 0.4 % — SIGNIFICANT CHANGE UP (ref 0–0.9)
LYMPHOCYTES # BLD AUTO: 0.52 K/UL — LOW (ref 1–3.3)
LYMPHOCYTES # BLD AUTO: 4.8 % — LOW (ref 13–44)
MAGNESIUM SERPL-MCNC: 1.6 MG/DL — SIGNIFICANT CHANGE UP (ref 1.6–2.6)
MCHC RBC-ENTMCNC: 32.5 PG — SIGNIFICANT CHANGE UP (ref 27–34)
MCHC RBC-ENTMCNC: 34.2 GM/DL — SIGNIFICANT CHANGE UP (ref 32–36)
MCV RBC AUTO: 95.1 FL — SIGNIFICANT CHANGE UP (ref 80–100)
MONOCYTES # BLD AUTO: 0.14 K/UL — SIGNIFICANT CHANGE UP (ref 0–0.9)
MONOCYTES NFR BLD AUTO: 1.3 % — LOW (ref 2–14)
NEUTROPHILS # BLD AUTO: 10.04 K/UL — HIGH (ref 1.8–7.4)
NEUTROPHILS NFR BLD AUTO: 93.3 % — HIGH (ref 43–77)
PHOSPHATE SERPL-MCNC: 3.1 MG/DL — SIGNIFICANT CHANGE UP (ref 2.4–4.7)
PLATELET # BLD AUTO: 182 K/UL — SIGNIFICANT CHANGE UP (ref 150–400)
POTASSIUM SERPL-MCNC: 3.7 MMOL/L — SIGNIFICANT CHANGE UP (ref 3.5–5.3)
POTASSIUM SERPL-SCNC: 3.7 MMOL/L — SIGNIFICANT CHANGE UP (ref 3.5–5.3)
RBC # BLD: 3.26 M/UL — LOW (ref 3.8–5.2)
RBC # FLD: 12.6 % — SIGNIFICANT CHANGE UP (ref 10.3–14.5)
SODIUM SERPL-SCNC: 137 MMOL/L — SIGNIFICANT CHANGE UP (ref 135–145)
WBC # BLD: 10.76 K/UL — HIGH (ref 3.8–10.5)
WBC # FLD AUTO: 10.76 K/UL — HIGH (ref 3.8–10.5)

## 2022-10-22 PROCEDURE — 99024 POSTOP FOLLOW-UP VISIT: CPT | Mod: GC

## 2022-10-22 DEVICE — CLIP APPLIER COVIDIEN ENDOCLIP III 5MM: Type: IMPLANTABLE DEVICE | Status: FUNCTIONAL

## 2022-10-22 DEVICE — STAPLER COVIDIEN TRI-STAPLE 45MM TAN RELOAD: Type: IMPLANTABLE DEVICE | Status: FUNCTIONAL

## 2022-10-22 DEVICE — IMP OSSICULA TOTAL 3MM: Type: IMPLANTABLE DEVICE | Status: FUNCTIONAL

## 2022-10-22 RX ORDER — INFLUENZA VIRUS VACCINE 15; 15; 15; 15 UG/.5ML; UG/.5ML; UG/.5ML; UG/.5ML
0.7 SUSPENSION INTRAMUSCULAR ONCE
Refills: 0 | Status: COMPLETED | OUTPATIENT
Start: 2022-10-22 | End: 2022-10-23

## 2022-10-22 RX ORDER — ATORVASTATIN CALCIUM 80 MG/1
10 TABLET, FILM COATED ORAL AT BEDTIME
Refills: 0 | Status: DISCONTINUED | OUTPATIENT
Start: 2022-10-22 | End: 2022-10-23

## 2022-10-22 RX ORDER — METHOTREXATE 2.5 MG/1
0 TABLET ORAL
Qty: 0 | Refills: 2 | DISCHARGE

## 2022-10-22 RX ORDER — IBUPROFEN 200 MG
0 TABLET ORAL
Qty: 0 | Refills: 0 | DISCHARGE

## 2022-10-22 RX ORDER — CHOLECALCIFEROL (VITAMIN D3) 125 MCG
0 CAPSULE ORAL
Qty: 0 | Refills: 5 | DISCHARGE

## 2022-10-22 RX ORDER — KETOROLAC TROMETHAMINE 30 MG/ML
15 SYRINGE (ML) INJECTION ONCE
Refills: 0 | Status: DISCONTINUED | OUTPATIENT
Start: 2022-10-22 | End: 2022-10-22

## 2022-10-22 RX ORDER — FOLIC ACID 0.8 MG
0 TABLET ORAL
Qty: 0 | Refills: 5 | DISCHARGE

## 2022-10-22 RX ORDER — ACETAMINOPHEN 500 MG
1000 TABLET ORAL ONCE
Refills: 0 | Status: DISCONTINUED | OUTPATIENT
Start: 2022-10-22 | End: 2022-10-22

## 2022-10-22 RX ORDER — SODIUM CHLORIDE 9 MG/ML
1000 INJECTION, SOLUTION INTRAVENOUS
Refills: 0 | Status: DISCONTINUED | OUTPATIENT
Start: 2022-10-22 | End: 2022-10-23

## 2022-10-22 RX ORDER — MAGNESIUM OXIDE 400 MG ORAL TABLET 241.3 MG
400 TABLET ORAL
Refills: 0 | Status: COMPLETED | OUTPATIENT
Start: 2022-10-22 | End: 2022-10-23

## 2022-10-22 RX ORDER — KETOROLAC TROMETHAMINE 30 MG/ML
15 SYRINGE (ML) INJECTION EVERY 6 HOURS
Refills: 0 | Status: DISCONTINUED | OUTPATIENT
Start: 2022-10-22 | End: 2022-10-23

## 2022-10-22 RX ORDER — PYRIDOXINE HCL (VITAMIN B6) 100 MG
0 TABLET ORAL
Qty: 0 | Refills: 1 | DISCHARGE

## 2022-10-22 RX ORDER — SODIUM,POTASSIUM PHOSPHATES 278-250MG
2 POWDER IN PACKET (EA) ORAL EVERY 6 HOURS
Refills: 0 | Status: COMPLETED | OUTPATIENT
Start: 2022-10-22 | End: 2022-10-23

## 2022-10-22 RX ORDER — ASPIRIN/CALCIUM CARB/MAGNESIUM 324 MG
0 TABLET ORAL
Qty: 0 | Refills: 0 | DISCHARGE

## 2022-10-22 RX ORDER — ACETAMINOPHEN 500 MG
650 TABLET ORAL EVERY 6 HOURS
Refills: 0 | Status: DISCONTINUED | OUTPATIENT
Start: 2022-10-22 | End: 2022-10-23

## 2022-10-22 RX ORDER — ATORVASTATIN CALCIUM 80 MG/1
0 TABLET, FILM COATED ORAL
Qty: 0 | Refills: 0 | DISCHARGE

## 2022-10-22 RX ORDER — SIMETHICONE 80 MG/1
80 TABLET, CHEWABLE ORAL THREE TIMES A DAY
Refills: 0 | Status: DISCONTINUED | OUTPATIENT
Start: 2022-10-22 | End: 2022-10-23

## 2022-10-22 RX ORDER — LEVOTHYROXINE SODIUM 125 MCG
0 TABLET ORAL
Qty: 0 | Refills: 0 | DISCHARGE

## 2022-10-22 RX ORDER — ONDANSETRON 8 MG/1
4 TABLET, FILM COATED ORAL ONCE
Refills: 0 | Status: DISCONTINUED | OUTPATIENT
Start: 2022-10-22 | End: 2022-10-22

## 2022-10-22 RX ORDER — ENOXAPARIN SODIUM 100 MG/ML
40 INJECTION SUBCUTANEOUS EVERY 24 HOURS
Refills: 0 | Status: DISCONTINUED | OUTPATIENT
Start: 2022-10-22 | End: 2022-10-23

## 2022-10-22 RX ORDER — TRAMADOL HYDROCHLORIDE 50 MG/1
50 TABLET ORAL EVERY 6 HOURS
Refills: 0 | Status: DISCONTINUED | OUTPATIENT
Start: 2022-10-22 | End: 2022-10-23

## 2022-10-22 RX ORDER — HYDROMORPHONE HYDROCHLORIDE 2 MG/ML
0.5 INJECTION INTRAMUSCULAR; INTRAVENOUS; SUBCUTANEOUS
Refills: 0 | Status: DISCONTINUED | OUTPATIENT
Start: 2022-10-22 | End: 2022-10-22

## 2022-10-22 RX ORDER — FOLIC ACID 0.8 MG
1 TABLET ORAL DAILY
Refills: 0 | Status: DISCONTINUED | OUTPATIENT
Start: 2022-10-22 | End: 2022-10-23

## 2022-10-22 RX ORDER — HEXAVITAMINS
0 TABLET ORAL
Qty: 0 | Refills: 1 | DISCHARGE

## 2022-10-22 RX ORDER — PIPERACILLIN AND TAZOBACTAM 4; .5 G/20ML; G/20ML
3.38 INJECTION, POWDER, LYOPHILIZED, FOR SOLUTION INTRAVENOUS EVERY 8 HOURS
Refills: 0 | Status: DISCONTINUED | OUTPATIENT
Start: 2022-10-22 | End: 2022-10-23

## 2022-10-22 RX ORDER — LOVASTATIN 20 MG
0 TABLET ORAL
Qty: 0 | Refills: 0 | DISCHARGE

## 2022-10-22 RX ORDER — PIPERACILLIN AND TAZOBACTAM 4; .5 G/20ML; G/20ML
3.38 INJECTION, POWDER, LYOPHILIZED, FOR SOLUTION INTRAVENOUS ONCE
Refills: 0 | Status: COMPLETED | OUTPATIENT
Start: 2022-10-22 | End: 2022-10-22

## 2022-10-22 RX ORDER — ASPIRIN/CALCIUM CARB/MAGNESIUM 324 MG
81 TABLET ORAL DAILY
Refills: 0 | Status: DISCONTINUED | OUTPATIENT
Start: 2022-10-22 | End: 2022-10-23

## 2022-10-22 RX ORDER — LEVOTHYROXINE SODIUM 125 MCG
50 TABLET ORAL DAILY
Refills: 0 | Status: DISCONTINUED | OUTPATIENT
Start: 2022-10-22 | End: 2022-10-23

## 2022-10-22 RX ADMIN — Medication 81 MILLIGRAM(S): at 12:56

## 2022-10-22 RX ADMIN — SIMETHICONE 80 MILLIGRAM(S): 80 TABLET, CHEWABLE ORAL at 22:33

## 2022-10-22 RX ADMIN — PIPERACILLIN AND TAZOBACTAM 25 GRAM(S): 4; .5 INJECTION, POWDER, LYOPHILIZED, FOR SOLUTION INTRAVENOUS at 21:46

## 2022-10-22 RX ADMIN — Medication 2 TABLET(S): at 22:33

## 2022-10-22 RX ADMIN — Medication 50 MICROGRAM(S): at 05:55

## 2022-10-22 RX ADMIN — PIPERACILLIN AND TAZOBACTAM 25 GRAM(S): 4; .5 INJECTION, POWDER, LYOPHILIZED, FOR SOLUTION INTRAVENOUS at 05:58

## 2022-10-22 RX ADMIN — Medication 2 TABLET(S): at 17:56

## 2022-10-22 RX ADMIN — SODIUM CHLORIDE 50 MILLILITER(S): 9 INJECTION, SOLUTION INTRAVENOUS at 05:58

## 2022-10-22 RX ADMIN — PIPERACILLIN AND TAZOBACTAM 200 GRAM(S): 4; .5 INJECTION, POWDER, LYOPHILIZED, FOR SOLUTION INTRAVENOUS at 02:53

## 2022-10-22 RX ADMIN — Medication 2 TABLET(S): at 12:54

## 2022-10-22 RX ADMIN — ENOXAPARIN SODIUM 40 MILLIGRAM(S): 100 INJECTION SUBCUTANEOUS at 05:55

## 2022-10-22 RX ADMIN — PIPERACILLIN AND TAZOBACTAM 25 GRAM(S): 4; .5 INJECTION, POWDER, LYOPHILIZED, FOR SOLUTION INTRAVENOUS at 15:34

## 2022-10-22 RX ADMIN — ATORVASTATIN CALCIUM 10 MILLIGRAM(S): 80 TABLET, FILM COATED ORAL at 21:38

## 2022-10-22 RX ADMIN — Medication 1 MILLIGRAM(S): at 12:54

## 2022-10-22 RX ADMIN — MAGNESIUM OXIDE 400 MG ORAL TABLET 400 MILLIGRAM(S): 241.3 TABLET ORAL at 12:54

## 2022-10-22 RX ADMIN — MAGNESIUM OXIDE 400 MG ORAL TABLET 400 MILLIGRAM(S): 241.3 TABLET ORAL at 17:56

## 2022-10-22 NOTE — BRIEF OPERATIVE NOTE - OPERATION/FINDINGS
Open umbilical entry. Appendix identified, inflamed and infected appearing without perforation. Mesoappendix dissected and appendix isolated, Stapled transection without issue, Mesoappendix then transected with electrocautery. Area irrigated and suctioned.  Hemostasis noted. Appendix removed and fascia closed at umbilical incision. All wounds skin closed then steri-strips.

## 2022-10-22 NOTE — PROGRESS NOTE ADULT - ATTENDING COMMENTS
Agree with above assessment.  The patient was seen and examined by myself with the surgical resident.   The patient is with no nausea, no vomit, mild incisional discomfort.  Abdomen is soft, no localizing tenderness, no guarding, no rebound, incision sites are clean without infection.  Continue with IV antibiotics, OOB mobilize, possible discharge soon.

## 2022-10-22 NOTE — PATIENT PROFILE ADULT - FALL HARM RISK - HARM RISK INTERVENTIONS
Assistance with ambulation/Assistance OOB with selected safe patient handling equipment/Communicate Risk of Fall with Harm to all staff/Discuss with provider need for PT consult/Monitor gait and stability/Provide patient with walking aids - walker, cane, crutches/Reinforce activity limits and safety measures with patient and family/Sit up slowly, dangle for a short time, stand at bedside before walking/Tailored Fall Risk Interventions/Use of alarms - bed, chair and/or voice tab/Visual Cue: Yellow wristband and red socks/Bed in lowest position, wheels locked, appropriate side rails in place/Call bell, personal items and telephone in reach/Instruct patient to call for assistance before getting out of bed or chair/Non-slip footwear when patient is out of bed/[place] to call system/Physically safe environment - no spills, clutter or unnecessary equipment/Purposeful Proactive Rounding/Room/bathroom lighting operational, light cord in reach

## 2022-10-23 ENCOUNTER — TRANSCRIPTION ENCOUNTER (OUTPATIENT)
Age: 77
End: 2022-10-23

## 2022-10-23 VITALS
RESPIRATION RATE: 18 BRPM | HEART RATE: 88 BPM | SYSTOLIC BLOOD PRESSURE: 148 MMHG | OXYGEN SATURATION: 95 % | TEMPERATURE: 98 F | DIASTOLIC BLOOD PRESSURE: 71 MMHG

## 2022-10-23 LAB
BASOPHILS # BLD AUTO: 0.03 K/UL — SIGNIFICANT CHANGE UP (ref 0–0.2)
BASOPHILS NFR BLD AUTO: 0.5 % — SIGNIFICANT CHANGE UP (ref 0–2)
EOSINOPHIL # BLD AUTO: 0.07 K/UL — SIGNIFICANT CHANGE UP (ref 0–0.5)
EOSINOPHIL NFR BLD AUTO: 1.2 % — SIGNIFICANT CHANGE UP (ref 0–6)
HCT VFR BLD CALC: 30.4 % — LOW (ref 34.5–45)
HGB BLD-MCNC: 10.2 G/DL — LOW (ref 11.5–15.5)
IMM GRANULOCYTES NFR BLD AUTO: 0.3 % — SIGNIFICANT CHANGE UP (ref 0–0.9)
LYMPHOCYTES # BLD AUTO: 1.34 K/UL — SIGNIFICANT CHANGE UP (ref 1–3.3)
LYMPHOCYTES # BLD AUTO: 23 % — SIGNIFICANT CHANGE UP (ref 13–44)
MCHC RBC-ENTMCNC: 32.3 PG — SIGNIFICANT CHANGE UP (ref 27–34)
MCHC RBC-ENTMCNC: 33.6 GM/DL — SIGNIFICANT CHANGE UP (ref 32–36)
MCV RBC AUTO: 96.2 FL — SIGNIFICANT CHANGE UP (ref 80–100)
MONOCYTES # BLD AUTO: 0.43 K/UL — SIGNIFICANT CHANGE UP (ref 0–0.9)
MONOCYTES NFR BLD AUTO: 7.4 % — SIGNIFICANT CHANGE UP (ref 2–14)
NEUTROPHILS # BLD AUTO: 3.94 K/UL — SIGNIFICANT CHANGE UP (ref 1.8–7.4)
NEUTROPHILS NFR BLD AUTO: 67.6 % — SIGNIFICANT CHANGE UP (ref 43–77)
PLATELET # BLD AUTO: 180 K/UL — SIGNIFICANT CHANGE UP (ref 150–400)
RBC # BLD: 3.16 M/UL — LOW (ref 3.8–5.2)
RBC # FLD: 12.7 % — SIGNIFICANT CHANGE UP (ref 10.3–14.5)
WBC # BLD: 5.83 K/UL — SIGNIFICANT CHANGE UP (ref 3.8–10.5)
WBC # FLD AUTO: 5.83 K/UL — SIGNIFICANT CHANGE UP (ref 3.8–10.5)

## 2022-10-23 PROCEDURE — 86850 RBC ANTIBODY SCREEN: CPT

## 2022-10-23 PROCEDURE — 82330 ASSAY OF CALCIUM: CPT

## 2022-10-23 PROCEDURE — 84132 ASSAY OF SERUM POTASSIUM: CPT

## 2022-10-23 PROCEDURE — 82803 BLOOD GASES ANY COMBINATION: CPT

## 2022-10-23 PROCEDURE — 83605 ASSAY OF LACTIC ACID: CPT

## 2022-10-23 PROCEDURE — 85018 HEMOGLOBIN: CPT

## 2022-10-23 PROCEDURE — 85025 COMPLETE CBC W/AUTO DIFF WBC: CPT

## 2022-10-23 PROCEDURE — 83735 ASSAY OF MAGNESIUM: CPT

## 2022-10-23 PROCEDURE — 99285 EMERGENCY DEPT VISIT HI MDM: CPT

## 2022-10-23 PROCEDURE — 99024 POSTOP FOLLOW-UP VISIT: CPT | Mod: GC

## 2022-10-23 PROCEDURE — C1889: CPT

## 2022-10-23 PROCEDURE — 82947 ASSAY GLUCOSE BLOOD QUANT: CPT

## 2022-10-23 PROCEDURE — 90662 IIV NO PRSV INCREASED AG IM: CPT

## 2022-10-23 PROCEDURE — 87637 SARSCOV2&INF A&B&RSV AMP PRB: CPT

## 2022-10-23 PROCEDURE — 87086 URINE CULTURE/COLONY COUNT: CPT

## 2022-10-23 PROCEDURE — 82435 ASSAY OF BLOOD CHLORIDE: CPT

## 2022-10-23 PROCEDURE — 84295 ASSAY OF SERUM SODIUM: CPT

## 2022-10-23 PROCEDURE — 86901 BLOOD TYPING SEROLOGIC RH(D): CPT

## 2022-10-23 PROCEDURE — 80048 BASIC METABOLIC PNL TOTAL CA: CPT

## 2022-10-23 PROCEDURE — 80053 COMPREHEN METABOLIC PANEL: CPT

## 2022-10-23 PROCEDURE — 81001 URINALYSIS AUTO W/SCOPE: CPT

## 2022-10-23 PROCEDURE — 88304 TISSUE EXAM BY PATHOLOGIST: CPT

## 2022-10-23 PROCEDURE — 86900 BLOOD TYPING SEROLOGIC ABO: CPT

## 2022-10-23 PROCEDURE — 74177 CT ABD & PELVIS W/CONTRAST: CPT | Mod: MA

## 2022-10-23 PROCEDURE — 83690 ASSAY OF LIPASE: CPT

## 2022-10-23 PROCEDURE — 85014 HEMATOCRIT: CPT

## 2022-10-23 PROCEDURE — 84100 ASSAY OF PHOSPHORUS: CPT

## 2022-10-23 PROCEDURE — 36415 COLL VENOUS BLD VENIPUNCTURE: CPT

## 2022-10-23 RX ORDER — ACETAMINOPHEN 500 MG
650 TABLET ORAL EVERY 6 HOURS
Refills: 0 | Status: DISCONTINUED | OUTPATIENT
Start: 2022-10-23 | End: 2022-10-23

## 2022-10-23 RX ORDER — ACETAMINOPHEN 500 MG
2 TABLET ORAL
Qty: 0 | Refills: 0 | DISCHARGE
Start: 2022-10-23

## 2022-10-23 RX ADMIN — Medication 81 MILLIGRAM(S): at 11:38

## 2022-10-23 RX ADMIN — INFLUENZA VIRUS VACCINE 0.7 MILLILITER(S): 15; 15; 15; 15 SUSPENSION INTRAMUSCULAR at 13:15

## 2022-10-23 RX ADMIN — Medication 2 TABLET(S): at 05:47

## 2022-10-23 RX ADMIN — Medication 50 MICROGRAM(S): at 05:46

## 2022-10-23 RX ADMIN — MAGNESIUM OXIDE 400 MG ORAL TABLET 400 MILLIGRAM(S): 241.3 TABLET ORAL at 09:13

## 2022-10-23 RX ADMIN — ENOXAPARIN SODIUM 40 MILLIGRAM(S): 100 INJECTION SUBCUTANEOUS at 05:46

## 2022-10-23 RX ADMIN — PIPERACILLIN AND TAZOBACTAM 25 GRAM(S): 4; .5 INJECTION, POWDER, LYOPHILIZED, FOR SOLUTION INTRAVENOUS at 05:47

## 2022-10-23 RX ADMIN — Medication 1 MILLIGRAM(S): at 11:39

## 2022-10-23 NOTE — DISCHARGE NOTE PROVIDER - NSFOLLOWUPCLINICS_GEN_ALL_ED_FT
[facility]  Ac[facility] Surgery  [address] [place] [address]  Phone: [phone]  Fax:

## 2022-10-23 NOTE — DISCHARGE NOTE NURSING/CASE MANAGEMENT/SOCIAL WORK - NSDCPEFALRISK_GEN_ALL_CORE
For information on Fall & Injury Prevention, visit: [email or web address] OR  [email or web address] OR  https://www.cdc.gov/steadi/patient.html

## 2022-10-23 NOTE — DISCHARGE NOTE NURSING/CASE MANAGEMENT/SOCIAL WORK - PATIENT PORTAL LINK FT
You can access the FollowMyHealth Patient Portal offered by [facility] by registering at the following website: [email or web address]. By joining [facility]’s FollowMyHealth portal, you will also be able to view your health information using other applications (apps) compatible with our system.

## 2022-10-23 NOTE — PROGRESS NOTE ADULT - SUBJECTIVE AND OBJECTIVE BOX
HPI/OVERNIGHT EVENTS:  NAEON. Patient diet advanced on yesterday. Tolerating. Pain well controlled. Denies any fever/chills, CP, SOB, N&V. HDS. Afebrile.     MEDICATIONS  (STANDING):  aspirin enteric coated 81 milliGRAM(s) Oral daily  atorvastatin 10 milliGRAM(s) Oral at bedtime  cefoTEtan  IVPB 2 Gram(s) IV Intermittent once  enalapril 20 milliGRAM(s) Oral every 24 hours  enoxaparin Injectable 40 milliGRAM(s) SubCutaneous every 24 hours  folic acid 1 milliGRAM(s) Oral daily  influenza  Vaccine (HIGH DOSE) 0.7 milliLiter(s) IntraMuscular once  lactated ringers. 1000 milliLiter(s) (50 mL/Hr) IV Continuous <Continuous>  levothyroxine 50 MICROGram(s) Oral daily  magnesium oxide 400 milliGRAM(s) Oral three times a day with meals  ondansetron Injectable 4 milliGRAM(s) IV Push Once  piperacillin/tazobactam IVPB.. 3.375 Gram(s) IV Intermittent every 8 hours  potassium phosphate / sodium phosphate Tablet (K-PHOS No. 2) 2 Tablet(s) Oral every 6 hours    MEDICATIONS  (PRN):  acetaminophen     Tablet .. 650 milliGRAM(s) Oral every 6 hours PRN Moderate Pain (4 - 6)  ketorolac   Injectable 15 milliGRAM(s) IV Push every 6 hours PRN Moderate Pain (4 - 6)  simethicone 80 milliGRAM(s) Chew three times a day PRN Gas  traMADol 50 milliGRAM(s) Oral every 6 hours PRN Severe Pain (7 - 10)      Vital Signs Last 24 Hrs  T(C): 36.9 (22 Oct 2022 21:32), Max: 37.3 (22 Oct 2022 02:30)  T(F): 98.5 (22 Oct 2022 21:32), Max: 99.2 (22 Oct 2022 03:30)  HR: 83 (22 Oct 2022 21:32) (66 - 94)  BP: 114/61 (22 Oct 2022 21:32) (96/37 - 169/69)  BP(mean): 61 (22 Oct 2022 05:28) (53 - 67)  RR: 18 (22 Oct 2022 21:32) (12 - 18)  SpO2: 98% (22 Oct 2022 21:32) (94% - 99%)    Parameters below as of 22 Oct 2022 21:32  Patient On (Oxygen Delivery Method): room air        Constitutional: patient resting comfortably in bed, in no acute distress  Respiratory: respirations are unlabored, no accessory muscle use, no conversational dyspnea  Cardiovascular: regular rate & rhythm  Gastrointestinal: Abdomen soft, non-tender, non-distended, no rebound tenderness / guarding, incisions c/d/i   Neurological: AAOx3        I&O's Detail    21 Oct 2022 07:01  -  22 Oct 2022 07:00  --------------------------------------------------------  IN:    Lactated Ringers: 150 mL  Total IN: 150 mL    OUT:    Voided (mL): 800 mL  Total OUT: 800 mL    Total NET: -650 mL          LABS:                        10.6   10.76 )-----------( 182      ( 22 Oct 2022 05:07 )             31.0     10-    137  |  104  |  8.6  ----------------------------<  138<H>  3.7   |  21.0<L>  |  0.63    Ca    7.7<L>      22 Oct 2022 05:07  Phos  3.1     10-22  Mg     1.6     10-    TPro  7.0  /  Alb  4.2  /  TBili  0.5  /  DBili  x   /  AST  30  /  ALT  27  /  AlkPhos  90  10-21      Urinalysis Basic - ( 21 Oct 2022 20:55 )    Color: Yellow / Appearance: Clear / S.005 / pH: x  Gluc: x / Ketone: Moderate  / Bili: Negative / Urobili: Negative mg/dL   Blood: x / Protein: Negative / Nitrite: Negative   Leuk Esterase: Moderate / RBC: 0-2 /HPF / WBC 3-5 /HPF   Sq Epi: x / Non Sq Epi: Few / Bacteria: x      
PROGRESS NOTE / POST OP CHECK   Patient is POD 0 from lap appendectomy and was found resting in bed on evaluation. No complains of pain. Denies nausea, vomiting, chest pain, or SOB. Does not feel hungry. Incisions are c/d/i. Appendix appeared phlegmonous and so she will have IV abx for a short course.     MEDICATIONS  (STANDING):  aspirin enteric coated 81 milliGRAM(s) Oral daily  atorvastatin 10 milliGRAM(s) Oral at bedtime  cefoTEtan  IVPB 2 Gram(s) IV Intermittent once  enalapril 20 milliGRAM(s) Oral every 24 hours  enoxaparin Injectable 40 milliGRAM(s) SubCutaneous every 24 hours  folic acid 1 milliGRAM(s) Oral daily  influenza  Vaccine (HIGH DOSE) 0.7 milliLiter(s) IntraMuscular once  lactated ringers. 1000 milliLiter(s) (50 mL/Hr) IV Continuous <Continuous>  levothyroxine 50 MICROGram(s) Oral daily  ondansetron Injectable 4 milliGRAM(s) IV Push Once  piperacillin/tazobactam IVPB.. 3.375 Gram(s) IV Intermittent every 8 hours    MEDICATIONS  (PRN):  acetaminophen     Tablet .. 650 milliGRAM(s) Oral every 6 hours PRN Moderate Pain (4 - 6)  ketorolac   Injectable 15 milliGRAM(s) IV Push every 6 hours PRN Moderate Pain (4 - 6)  traMADol 50 milliGRAM(s) Oral every 6 hours PRN Severe Pain (7 - 10)      Vital Signs Last 24 Hrs  T(C): 36.4 (22 Oct 2022 05:50), Max: 37.4 (21 Oct 2022 19:31)  T(F): 97.6 (22 Oct 2022 05:50), Max: 99.3 (21 Oct 2022 19:31)  HR: 68 (22 Oct 2022 05:50) (66 - 111)  BP: 114/69 (22 Oct 2022 05:50) (96/37 - 121/62)  BP(mean): 61 (22 Oct 2022 05:28) (53 - 67)  RR: 17 (22 Oct 2022 05:50) (12 - 20)  SpO2: 96% (22 Oct 2022 05:50) (94% - 100%)    Parameters below as of 22 Oct 2022 05:50  Patient On (Oxygen Delivery Method): room air        Constitutional: patient resting comfortably in bed, in no acute distress  HEENT: EOMI, no active drainage or redness  Neck: Full ROM without pain  Respiratory: respirations are unlabored, no accessory muscle use, no conversational dyspnea  Cardiovascular: regular rate & rhythm  Gastrointestinal: Abdomen soft, mildly appropriately tender, non-distended. incisions c/d/i  Neurological: no gross sensory / motor / coordination changes  Musculoskeletal: No limitation of movement      I&O's Detail    21 Oct 2022 07:01  -  22 Oct 2022 07:00  --------------------------------------------------------  IN:    Lactated Ringers: 150 mL  Total IN: 150 mL    OUT:    Voided (mL): 800 mL  Total OUT: 800 mL    Total NET: -650 mL          LABS:                        10.6   10.76 )-----------( 182      ( 22 Oct 2022 05:07 )             31.0     10    137  |  104  |  8.6  ----------------------------<  138<H>  3.7   |  21.0<L>  |  0.63    Ca    7.7<L>      22 Oct 2022 05:07  Phos  3.1     10  Mg     1.6     10-22    TPro  7.0  /  Alb  4.2  /  TBili  0.5  /  DBili  x   /  AST  30  /  ALT  27  /  AlkPhos  90  10-      Urinalysis Basic - ( 21 Oct 2022 20:55 )    Color: Yellow / Appearance: Clear / S.005 / pH: x  Gluc: x / Ketone: Moderate  / Bili: Negative / Urobili: Negative mg/dL   Blood: x / Protein: Negative / Nitrite: Negative   Leuk Esterase: Moderate / RBC: 0-2 /HPF / WBC 3-5 /HPF   Sq Epi: x / Non Sq Epi: Few / Bacteria: x

## 2022-10-23 NOTE — DISCHARGE NOTE PROVIDER - NSDCMRMEDTOKEN_GEN_ALL_CORE_FT
acetaminophen 325 mg oral tablet: 2 tab(s) orally every 6 hours, As needed for mild pain  aspirin:   ENALAPRIL MALEATE 20 MG TAB: TAKE 1 TABLET BY MOUTH EVERY DAY  FOLIC ACID 1 MG TABLET: TAKE 1 TABLET BY MOUTH EVERY DAY  ISONIAZID 300 MG TABLET: TAKE 1 TABLET BY MOUTH EVERY DAY AS DIRECTED  LEVOTHYROXINE 50 MCG TABLET: TAKE 1 TABLET BY MOUTH EVERY DAY  LOVASTATIN 40 MG TABLET: TAKE 1 TABLET BY MOUTH EVERY DAY BEDTIME  METHOTREXATE 2.5 MG TABLET: TAKE 4 TABLETS WEEKLY.  VITAMIN B-6 100 MG TABLET: TAKE 1 TABLET BY MOUTH EVERY DAY AS DIRECTED  VITAMIN D3 50 MCG SOFTGEL: TAKE 1 CAPSULE BY MOUTH EVERY DAY

## 2022-10-23 NOTE — DISCHARGE NOTE PROVIDER - HOSPITAL COURSE
Admission HPI: 77 yo F with PMH of HTN, HLD, hypothyroidism and no past surgical history presents to the ED with 1 day of acute onset abdominal pain. She reports pain began yesterday evening, without precipitating event, and is associated with nausea though no emesis. Pain is new and patient denies experiencing similar pain in the past, though does endorse known history of gallstones. She indicates pain began periumbilically and locates the majority of pain now in the RLQ. Last BM yesterday, continues to pass flatus. Denies subjective fevers or chills.  (21 Oct 2022 20:34)    Hospital Course: CT scan showed acute appendicitis. Patient was admitted to the acute care surgery service and taken to the OR on 10/21 for lap appendectomy. Per brief operative note, appendix was "inflamed and infected appearing without perforation" and as such, operative surgeon recommended continuing antibiotics post-op and observing patient. Patient progressed well and is tolerating a regular diet with pain adequately controlled on PO medications. WBC 5.83 on POD2 and pt remains afebrile. She is tolerating diet, pain controlled, OOB ambulating and voiding. Stable for discharge with outpatient follow-up.    Patient is advised to RETURN TO THE EMERGENCY DEPARTMENT for any of the following - worsening pain, fever/chills, nausea/vomiting, altered mental status, chest pain, shortness of breath, or any other new / worsening symptom.

## 2022-10-23 NOTE — PROGRESS NOTE ADULT - ASSESSMENT
75yo F with acute appendicitis now s/p laparoscopic appendectomy. Doing well postoperatively. As appendix was inflamed and infected appearing, decision for post op IV abx. HD stable otherwise with no further complaints.     - IV Abx  - Poss adv diet  - PRN pain control  - Home meds resumed  - DVT ppx  - Encourage OOB / ambulation     
75yo F with acute appendicitis now s/p laparoscopic appendectomy. Intra-operatively,  appendix was inflamed and infected appearing, remains here for IV abx. WBC trending down. afebrile. Tolerating a diet.     - IV Abx, stop on today  - Follow up AM CBC   - DASH diet   - PRN pain control  - DVT ppx  - Encourage OOB / ambulation\    Dispo: D/C on today

## 2022-10-23 NOTE — DISCHARGE NOTE PROVIDER - NSDCCPCAREPLAN_GEN_ALL_CORE_FT
PRINCIPAL DISCHARGE DIAGNOSIS  Diagnosis: Acute appendicitis  Assessment and Plan of Treatment: Follow up: Please call and make an appointment with the Acute Care Surgery Clinic 10-14 days after discharge. Also, please call and make an appointment with your primary care physician as per your usual schedule.   Activity: May return to normal activities as tolerated, however refrain from heavy lifting > 10-15 lbs.  Diet: May continue regular diet.  Medications: Please resume all home medications as previously prescribed. Tylenol for pain relief, as needed.  Wound Care: Please, keep wound site clean and dry. You may shower, but do not bathe.  Patient is advised to RETURN TO THE EMERGENCY DEPARTMENT for any of the following - worsening pain, fever/chills, nausea/vomiting, altered mental status, chest pain, shortness of breath, or any other new / worsening symptom.

## 2022-10-23 NOTE — PROGRESS NOTE ADULT - ATTENDING COMMENTS
Agree with above assessment.  The patient was seen and examined by myself with the surgical PA and resident. The patient is without abdominal pain, nausea, or vomit.  Abdomen is soft and non tender, incisions are without infection.  The patient is surgically stable for discharge home.

## 2022-10-24 LAB
CULTURE RESULTS: SIGNIFICANT CHANGE UP
SPECIMEN SOURCE: SIGNIFICANT CHANGE UP

## 2022-10-26 LAB — SURGICAL PATHOLOGY STUDY: SIGNIFICANT CHANGE UP

## 2022-10-27 PROBLEM — E03.9 HYPOTHYROIDISM, UNSPECIFIED: Chronic | Status: ACTIVE | Noted: 2022-10-21

## 2022-11-03 ENCOUNTER — APPOINTMENT (OUTPATIENT)
Dept: TRAUMA SURGERY | Facility: CLINIC | Age: 77
End: 2022-11-03

## 2022-11-03 VITALS
TEMPERATURE: 98.2 F | SYSTOLIC BLOOD PRESSURE: 110 MMHG | DIASTOLIC BLOOD PRESSURE: 69 MMHG | HEART RATE: 59 BPM | OXYGEN SATURATION: 98 %

## 2022-11-03 DIAGNOSIS — K35.80 UNSPECIFIED ACUTE APPENDICITIS: ICD-10-CM

## 2022-11-03 DIAGNOSIS — Z90.49 ACQUIRED ABSENCE OF OTHER SPECIFIED PARTS OF DIGESTIVE TRACT: ICD-10-CM

## 2022-11-03 PROCEDURE — 99024 POSTOP FOLLOW-UP VISIT: CPT

## 2022-11-04 PROBLEM — Z90.49 STATUS POST LAPAROSCOPIC APPENDECTOMY: Status: ACTIVE | Noted: 2022-11-04

## 2022-11-04 PROBLEM — K35.80 ACUTE APPENDICITIS: Status: ACTIVE | Noted: 2022-11-04

## 2022-11-04 NOTE — HISTORY OF PRESENT ILLNESS
[FreeTextEntry1] : Patient presents  to [facility]  clinic  for post op exam  s/p laparoscopic appendectomy   As per patient denies  any  fevers  no chills  no  n/v/d   nl bm bl urination. denies  any  abdominal pain  Daughter at bedside  for  entire  exam

## 2022-11-04 NOTE — ASSESSMENT
[FreeTextEntry1] : RTC prn \par No heavy lifting  pulling and  or  pushing\par F/u GI due  to  age  of appendicitis - discussed with patient  and  daughter pathology results\par Discussion with patient   All questions answered  Any acute symptoms and or concerns patient understands  to call back office  and  or  go  directly to [facility] ED\par

## 2022-11-04 NOTE — PHYSICAL EXAM
[Normal Breath Sounds] : Normal breath sounds [Normal Heart Sounds] : normal heart sounds [Abdomen Tenderness] : ~T ~M No abdominal tenderness [No Rash or Lesion] : No rash or lesion [Purpura] : no purpura  [Petechiae] : no petechiae [Skin Ulcer] : no ulcer [Skin Induration] : no induration [Alert] : alert [Oriented to Person] : oriented to person [Oriented to Place] : oriented to place [Oriented to Time] : oriented to time [Calm] : calm [de-identified] : wdwn female  in  nad [de-identified] : non icteric sclera PERRLA EOMS intact  and  nl [de-identified] : trachea  midline [de-identified] : soft  flat  no  distension  non tender  abdomen  all incision sites c/d/i  no  signs  of  cellulitis  no  formation  of  seroma  no  guarding  no rebound

## 2022-12-05 ENCOUNTER — RX RENEWAL (OUTPATIENT)
Age: 77
End: 2022-12-05

## 2022-12-19 ENCOUNTER — APPOINTMENT (OUTPATIENT)
Dept: RHEUMATOLOGY | Facility: CLINIC | Age: 77
End: 2022-12-19
Payer: MEDICAID

## 2022-12-19 LAB
ALBUMIN SERPL ELPH-MCNC: 4.4 G/DL
ALP BLD-CCNC: 60 U/L
ALT SERPL-CCNC: 28 U/L
ANION GAP SERPL CALC-SCNC: 13 MMOL/L
AST SERPL-CCNC: 30 U/L
BASOPHILS # BLD AUTO: 0.06 K/UL
BASOPHILS NFR BLD AUTO: 1.8 %
BILIRUB SERPL-MCNC: 0.4 MG/DL
BUN SERPL-MCNC: 15 MG/DL
CALCIUM SERPL-MCNC: 9.3 MG/DL
CHLORIDE SERPL-SCNC: 102 MMOL/L
CO2 SERPL-SCNC: 23 MMOL/L
CREAT SERPL-MCNC: 0.79 MG/DL
CRP SERPL-MCNC: <3 MG/L
EGFR: 77 ML/MIN/1.73M2
EOSINOPHIL # BLD AUTO: 0.23 K/UL
EOSINOPHIL NFR BLD AUTO: 6.9 %
GLUCOSE SERPL-MCNC: 88 MG/DL
HCT VFR BLD CALC: 38.2 %
HEPB DNA PCR INT: NOT DETECTED
HEPB DNA PCR LOG: NOT DETECTED LOGIU/ML
HGB BLD-MCNC: 12.8 G/DL
IMM GRANULOCYTES NFR BLD AUTO: 0 %
LYMPHOCYTES # BLD AUTO: 1.2 K/UL
LYMPHOCYTES NFR BLD AUTO: 35.8 %
MAN DIFF?: NORMAL
MCHC RBC-ENTMCNC: 33.2 PG
MCHC RBC-ENTMCNC: 33.5 GM/DL
MCV RBC AUTO: 99 FL
MONOCYTES # BLD AUTO: 0.28 K/UL
MONOCYTES NFR BLD AUTO: 8.4 %
NEUTROPHILS # BLD AUTO: 1.58 K/UL
NEUTROPHILS NFR BLD AUTO: 47.1 %
PLATELET # BLD AUTO: 192 K/UL
POTASSIUM SERPL-SCNC: 4.5 MMOL/L
PROT SERPL-MCNC: 6.5 G/DL
RBC # BLD: 3.86 M/UL
RBC # FLD: 13.2 %
SODIUM SERPL-SCNC: 138 MMOL/L
WBC # FLD AUTO: 3.35 K/UL

## 2022-12-19 PROCEDURE — 99214 OFFICE O/P EST MOD 30 MIN: CPT

## 2022-12-22 NOTE — PHYSICAL EXAM
[General Appearance - Well Nourished] : well nourished [General Appearance - Well Developed] : well developed [Sclera] : the sclera and conjunctiva were normal [Hearing Threshold Finger Rub Not [place]] : hearing was normal [Affect] : the affect was normal [Mood] : the mood was normal

## 2022-12-22 NOTE — ASSESSMENT
[FreeTextEntry1] : 78 yo woman with macular degeneration, HTN, HLD,hypothyroid,  stroke R sided weakness, seropositive RA , Hep B core positive , quantiferon positive, CCT with an opacity that will be monitored by pulm ,OP with multiple fractures.  NOw on INH with B6, monitoring hep B VL , on MTX and FA and doing better\par \par --Reclast last given 6/21/2022.  ( last done 10/2020.  ordered dexa today \par --cont calcium  and vitamin D \par --quantiferon positive: 9 months of INH and vit B6.  to be done this month\par --hep b core positive: hep b VL every 3 months \par --abnormal CCT: follow up with pulm, repeat CCT 3/2023\par --RA cont MTX with FA

## 2022-12-22 NOTE — HISTORY OF PRESENT ILLNESS
[Other Location: e.g. Home (Enter Location, City,State)___] : at [unfilled] [Home] : at home, [unfilled] , at the time of the visit. [Verbal consent obtained from patient] : the patient, [unfilled] [FreeTextEntry1] : 75 yo woman with history macular degeneration,  HTN, HLD, Stroke with residual right sided weakness, hypothyroidism and osteoporosis with multiple fracture ( wrist/compression fracture/pelvic-all considered osteoporotic fx) and seropositive RA \par \par osteoporosis: multiple fractures. previously on alendronate and prolia started in 2018.  she had 4 prolia injection.  she states that she experiences severe body pain after the prolia that last about 3 days. she also had a rash with prolia.  she does not want to continue prolia.    patient had DEXA 10/2020 showing T score L1-L4 -3.0  ( from -3.6 in 2016) and femoral neck -2.6 ( stable from 2016).  \par patient received reclast 3/16/2021, 6/21/2022\par taking calcium and vit d \par last dexa 10/2020\par \par seropositive RA : patient has macular degeneration can not start HCQ.  she is noted to be positive quantiferon and has RUL ill defines opacity on CCT.  she denies any sob or cough, wt loss , night sweats. She is on INH and vit b6. she saw pulm and will have a repeat CCT 3/2023. she also noted to have hep b core positive. Saw GI and note states that she can either consider ppx or monitor VL.   \par \par today: patient is on MTX and FA and denies any joint swelling.  she has minimal morning stiffness on some days.  she is on calcium and vit d for OP. she had reclast 6/2022 and tolerated this well.  she remains on INH and B6 and will be done this month.\par she is hep B core positive and her recent VL wwas negative \par she also has an abnormal CCT which is being monitored.  she will need a f/u CCT 3/2023.  patient denies any wt loss, cough or sob     \par \par she has dry eye and stable alopecia \par no cp/sob/cough, facial rash or oral lesions.  no history of serositis or low plts.  possible low wbc in the past??  \par \par \par CCP>250\par negative MONE/ro/la \par CPK normal \par crp normal \par esr 21\par \par hand xray: T old healed radial metaphyseal Fx deformity with ulnar variance.  carpal bone normal, joint space preserved and no erosions \par CCT: ill defines linear opacity RUL most likley represent an area of scarring\par

## 2023-01-23 ENCOUNTER — OFFICE (OUTPATIENT)
Dept: URBAN - METROPOLITAN AREA CLINIC 94 | Facility: CLINIC | Age: 78
Setting detail: OPHTHALMOLOGY
End: 2023-01-23
Payer: MEDICAID

## 2023-01-23 DIAGNOSIS — H35.3231: ICD-10-CM

## 2023-01-23 DIAGNOSIS — H35.033: ICD-10-CM

## 2023-01-23 DIAGNOSIS — H35.373: ICD-10-CM

## 2023-01-23 DIAGNOSIS — H43.813: ICD-10-CM

## 2023-01-23 PROCEDURE — 92012 INTRM OPH EXAM EST PATIENT: CPT | Performed by: SPECIALIST

## 2023-01-23 PROCEDURE — 92134 CPTRZ OPH DX IMG PST SGM RTA: CPT | Performed by: SPECIALIST

## 2023-01-23 PROCEDURE — 92235 FLUORESCEIN ANGRPH MLTIFRAME: CPT | Performed by: SPECIALIST

## 2023-01-23 ASSESSMENT — AXIALLENGTH_DERIVED
OS_AL: 24.0747
OD_AL: 24.4141

## 2023-01-23 ASSESSMENT — REFRACTION_AUTOREFRACTION
OD_SPHERE: +0.25
OS_AXIS: 095
OS_SPHERE: 0.00
OS_CYLINDER: -1.25
OD_AXIS: 090
OD_CYLINDER: -1.75

## 2023-01-23 ASSESSMENT — REFRACTION_CURRENTRX
OS_ADD: +2.50
OS_OVR_VA: 20/
OD_CYLINDER: -0.75
OS_AXIS: 095
OS_CYLINDER: -0.75
OS_SPHERE: +0.50
OS_VPRISM_DIRECTION: BF
OD_SPHERE: +0.25
OD_OVR_VA: 20/
OD_AXIS: 095
OD_ADD: +2.75
OD_VPRISM_DIRECTION: BF

## 2023-01-23 ASSESSMENT — CONFRONTATIONAL VISUAL FIELD TEST (CVF)
OS_FINDINGS: FULL
OD_FINDINGS: FULL

## 2023-01-23 ASSESSMENT — SPHEQUIV_DERIVED
OD_SPHEQUIV: -0.625
OS_SPHEQUIV: -0.625

## 2023-01-23 ASSESSMENT — KERATOMETRY
OS_AXISANGLE_DEGREES: 180
OD_AXISANGLE_DEGREES: 010
OS_K1POWER_DIOPTERS: 42.25
OD_K2POWER_DIOPTERS: 43.50
OD_K1POWER_DIOPTERS: 40.50
OS_K2POWER_DIOPTERS: 43.50

## 2023-01-23 ASSESSMENT — TONOMETRY
OD_IOP_MMHG: 15
OS_IOP_MMHG: 16

## 2023-01-23 ASSESSMENT — VISUAL ACUITY
OD_BCVA: 20/50
OS_BCVA: 20/30

## 2023-01-23 ASSESSMENT — CORNEAL EDEMA CLINICAL DESCRIPTION: OD_CORNEALEDEMA: T

## 2023-02-13 ENCOUNTER — OFFICE (OUTPATIENT)
Dept: URBAN - METROPOLITAN AREA CLINIC 94 | Facility: CLINIC | Age: 78
Setting detail: OPHTHALMOLOGY
End: 2023-02-13
Payer: MEDICAID

## 2023-02-13 DIAGNOSIS — H35.3211: ICD-10-CM

## 2023-02-13 DIAGNOSIS — H35.373: ICD-10-CM

## 2023-02-13 DIAGNOSIS — H35.033: ICD-10-CM

## 2023-02-13 DIAGNOSIS — H35.3231: ICD-10-CM

## 2023-02-13 PROCEDURE — 67028 INJECTION EYE DRUG: CPT | Performed by: SPECIALIST

## 2023-02-13 PROCEDURE — 92134 CPTRZ OPH DX IMG PST SGM RTA: CPT | Performed by: SPECIALIST

## 2023-02-13 ASSESSMENT — SPHEQUIV_DERIVED
OD_SPHEQUIV: -0.625
OS_SPHEQUIV: -0.625

## 2023-02-13 ASSESSMENT — REFRACTION_CURRENTRX
OS_OVR_VA: 20/
OS_VPRISM_DIRECTION: BF
OS_AXIS: 095
OD_OVR_VA: 20/
OS_CYLINDER: -0.75
OD_AXIS: 095
OD_CYLINDER: -0.75
OD_VPRISM_DIRECTION: BF
OD_SPHERE: +0.25
OD_ADD: +2.75
OS_SPHERE: +0.50
OS_ADD: +2.50

## 2023-02-13 ASSESSMENT — VISUAL ACUITY
OS_BCVA: 20/30-1
OD_BCVA: 20/50

## 2023-02-13 ASSESSMENT — KERATOMETRY
OS_K1POWER_DIOPTERS: 42.25
OD_K1POWER_DIOPTERS: 40.50
OS_AXISANGLE_DEGREES: 180
OD_AXISANGLE_DEGREES: 010
OS_K2POWER_DIOPTERS: 43.50
OD_K2POWER_DIOPTERS: 43.50

## 2023-02-13 ASSESSMENT — AXIALLENGTH_DERIVED
OD_AL: 24.4141
OS_AL: 24.0747

## 2023-02-13 ASSESSMENT — TONOMETRY
OS_IOP_MMHG: 14
OD_IOP_MMHG: 14

## 2023-02-13 ASSESSMENT — REFRACTION_AUTOREFRACTION
OS_CYLINDER: -1.25
OD_AXIS: 090
OS_AXIS: 095
OS_SPHERE: 0.00
OD_SPHERE: +0.25
OD_CYLINDER: -1.75

## 2023-02-13 ASSESSMENT — CONFRONTATIONAL VISUAL FIELD TEST (CVF)
OS_FINDINGS: FULL
OD_FINDINGS: FULL

## 2023-02-13 ASSESSMENT — CORNEAL EDEMA CLINICAL DESCRIPTION: OD_CORNEALEDEMA: T

## 2023-02-27 ENCOUNTER — OFFICE (OUTPATIENT)
Dept: URBAN - METROPOLITAN AREA CLINIC 94 | Facility: CLINIC | Age: 78
Setting detail: OPHTHALMOLOGY
End: 2023-02-27
Payer: MEDICAID

## 2023-02-27 DIAGNOSIS — H35.033: ICD-10-CM

## 2023-02-27 DIAGNOSIS — H35.373: ICD-10-CM

## 2023-02-27 DIAGNOSIS — H43.813: ICD-10-CM

## 2023-02-27 DIAGNOSIS — H35.3231: ICD-10-CM

## 2023-02-27 PROCEDURE — 92012 INTRM OPH EXAM EST PATIENT: CPT | Performed by: SPECIALIST

## 2023-02-27 PROCEDURE — 92134 CPTRZ OPH DX IMG PST SGM RTA: CPT | Performed by: SPECIALIST

## 2023-02-27 ASSESSMENT — REFRACTION_CURRENTRX
OD_AXIS: 095
OD_VPRISM_DIRECTION: BF
OS_CYLINDER: -0.75
OD_SPHERE: +0.25
OS_AXIS: 095
OS_OVR_VA: 20/
OS_SPHERE: +0.50
OS_VPRISM_DIRECTION: BF
OS_ADD: +2.50
OD_ADD: +2.75
OD_CYLINDER: -0.75
OD_OVR_VA: 20/

## 2023-02-27 ASSESSMENT — REFRACTION_AUTOREFRACTION
OS_AXIS: 095
OS_SPHERE: 0.00
OS_CYLINDER: -1.25
OD_SPHERE: +0.25
OD_AXIS: 090
OD_CYLINDER: -1.75

## 2023-02-27 ASSESSMENT — VISUAL ACUITY
OS_BCVA: 20/30-1
OD_BCVA: 20/50

## 2023-02-27 ASSESSMENT — SPHEQUIV_DERIVED
OD_SPHEQUIV: -0.625
OS_SPHEQUIV: -0.625

## 2023-02-27 ASSESSMENT — KERATOMETRY
OD_AXISANGLE_DEGREES: 010
OS_K1POWER_DIOPTERS: 42.25
OD_K2POWER_DIOPTERS: 43.50
OS_K2POWER_DIOPTERS: 43.50
OS_AXISANGLE_DEGREES: 180
OD_K1POWER_DIOPTERS: 40.50

## 2023-02-27 ASSESSMENT — CORNEAL EDEMA CLINICAL DESCRIPTION: OD_CORNEALEDEMA: T

## 2023-02-27 ASSESSMENT — CONFRONTATIONAL VISUAL FIELD TEST (CVF)
OS_FINDINGS: FULL
OD_FINDINGS: FULL

## 2023-02-27 ASSESSMENT — AXIALLENGTH_DERIVED
OD_AL: 24.4141
OS_AL: 24.0747

## 2023-03-10 ENCOUNTER — APPOINTMENT (OUTPATIENT)
Dept: RHEUMATOLOGY | Facility: CLINIC | Age: 78
End: 2023-03-10
Payer: MEDICAID

## 2023-03-10 VITALS
TEMPERATURE: 98.2 F | SYSTOLIC BLOOD PRESSURE: 125 MMHG | HEART RATE: 92 BPM | DIASTOLIC BLOOD PRESSURE: 60 MMHG | OXYGEN SATURATION: 98 %

## 2023-03-10 DIAGNOSIS — R76.12 NONSPECIFIC REACTION TO CELL MEDIATED IMMUNITY MEASUREMENT OF GAMMA INTERFERON ANTIGEN RESPONSE W/OUT ACTIVE TUBERCULOSIS: ICD-10-CM

## 2023-03-10 PROCEDURE — 99214 OFFICE O/P EST MOD 30 MIN: CPT

## 2023-03-27 ENCOUNTER — OFFICE (OUTPATIENT)
Dept: URBAN - METROPOLITAN AREA CLINIC 94 | Facility: CLINIC | Age: 78
Setting detail: OPHTHALMOLOGY
End: 2023-03-27
Payer: MEDICAID

## 2023-03-27 DIAGNOSIS — H35.3231: ICD-10-CM

## 2023-03-27 DIAGNOSIS — H43.813: ICD-10-CM

## 2023-03-27 DIAGNOSIS — H35.033: ICD-10-CM

## 2023-03-27 DIAGNOSIS — H35.373: ICD-10-CM

## 2023-03-27 PROCEDURE — 92012 INTRM OPH EXAM EST PATIENT: CPT | Performed by: SPECIALIST

## 2023-03-27 PROCEDURE — 92134 CPTRZ OPH DX IMG PST SGM RTA: CPT | Performed by: SPECIALIST

## 2023-03-27 ASSESSMENT — CONFRONTATIONAL VISUAL FIELD TEST (CVF)
OS_FINDINGS: FULL
OD_FINDINGS: FULL

## 2023-03-27 ASSESSMENT — SPHEQUIV_DERIVED
OS_SPHEQUIV: -0.625
OD_SPHEQUIV: -0.625

## 2023-03-27 ASSESSMENT — VISUAL ACUITY
OD_BCVA: 20/50
OS_BCVA: 20/40

## 2023-03-27 ASSESSMENT — CORNEAL EDEMA CLINICAL DESCRIPTION: OD_CORNEALEDEMA: T

## 2023-03-27 ASSESSMENT — REFRACTION_AUTOREFRACTION
OS_AXIS: 095
OD_CYLINDER: -1.75
OD_AXIS: 090
OS_CYLINDER: -1.25
OS_SPHERE: 0.00
OD_SPHERE: +0.25

## 2023-03-27 ASSESSMENT — KERATOMETRY
OD_AXISANGLE_DEGREES: 010
OD_K1POWER_DIOPTERS: 40.50
OS_K1POWER_DIOPTERS: 42.25
OS_K2POWER_DIOPTERS: 43.50
OD_K2POWER_DIOPTERS: 43.50
OS_AXISANGLE_DEGREES: 180

## 2023-03-27 ASSESSMENT — TONOMETRY
OS_IOP_MMHG: 15
OD_IOP_MMHG: 12

## 2023-03-27 ASSESSMENT — AXIALLENGTH_DERIVED
OS_AL: 24.0747
OD_AL: 24.4141

## 2023-03-29 ENCOUNTER — LABORATORY RESULT (OUTPATIENT)
Age: 78
End: 2023-03-29

## 2023-04-01 ENCOUNTER — OUTPATIENT (OUTPATIENT)
Dept: OUTPATIENT SERVICES | Facility: HOSPITAL | Age: 78
LOS: 1 days | End: 2023-04-01
Payer: COMMERCIAL

## 2023-04-01 ENCOUNTER — APPOINTMENT (OUTPATIENT)
Dept: RADIOLOGY | Facility: CLINIC | Age: 78
End: 2023-04-01
Payer: MEDICAID

## 2023-04-01 DIAGNOSIS — Z13.820 ENCOUNTER FOR SCREENING FOR OSTEOPOROSIS: ICD-10-CM

## 2023-04-01 PROCEDURE — 77080 DXA BONE DENSITY AXIAL: CPT | Mod: 26

## 2023-04-01 PROCEDURE — 77080 DXA BONE DENSITY AXIAL: CPT

## 2023-04-10 ENCOUNTER — NON-APPOINTMENT (OUTPATIENT)
Age: 78
End: 2023-04-10

## 2023-04-10 LAB
25(OH)D3 SERPL-MCNC: 58.1 NG/ML
ALBUMIN MFR SERPL ELPH: 63.6 %
ALBUMIN SERPL ELPH-MCNC: 4.7 G/DL
ALBUMIN SERPL-MCNC: 4.6 G/DL
ALBUMIN/GLOB SERPL: 1.7 RATIO
ALP BLD-CCNC: 77 U/L
ALPHA1 GLOB MFR SERPL ELPH: 3 %
ALPHA1 GLOB SERPL ELPH-MCNC: 0.2 G/DL
ALPHA2 GLOB MFR SERPL ELPH: 9.7 %
ALPHA2 GLOB SERPL ELPH-MCNC: 0.7 G/DL
ALT SERPL-CCNC: 29 U/L
ANION GAP SERPL CALC-SCNC: 15 MMOL/L
AST SERPL-CCNC: 28 U/L
B-GLOBULIN MFR SERPL ELPH: 10.9 %
B-GLOBULIN SERPL ELPH-MCNC: 0.8 G/DL
BASOPHILS # BLD AUTO: 0.08 K/UL
BASOPHILS NFR BLD AUTO: 2 %
BILIRUB SERPL-MCNC: 0.5 MG/DL
BUN SERPL-MCNC: 22 MG/DL
CALCIUM SERPL-MCNC: 9.2 MG/DL
CALCIUM SERPL-MCNC: 9.3 MG/DL
CHLORIDE SERPL-SCNC: 104 MMOL/L
CO2 SERPL-SCNC: 21 MMOL/L
CREAT SERPL-MCNC: 0.72 MG/DL
CRP SERPL-MCNC: <3 MG/L
EGFR: 86 ML/MIN/1.73M2
EOSINOPHIL # BLD AUTO: 0.31 K/UL
EOSINOPHIL NFR BLD AUTO: 7.7 %
GAMMA GLOB FLD ELPH-MCNC: 0.9 G/DL
GAMMA GLOB MFR SERPL ELPH: 12.8 %
GLUCOSE SERPL-MCNC: 91 MG/DL
HBV SURFACE AG SER QL: NONREACTIVE
HCT VFR BLD CALC: 40.9 %
HEPB DNA PCR INT: NOT DETECTED
HEPB DNA PCR LOG: NOT DETECTED LOGIU/ML
HGB BLD-MCNC: 13.4 G/DL
IMM GRANULOCYTES NFR BLD AUTO: 0 %
INTERPRETATION SERPL IEP-IMP: NORMAL
LYMPHOCYTES # BLD AUTO: 1.27 K/UL
LYMPHOCYTES NFR BLD AUTO: 31.6 %
MAGNESIUM SERPL-MCNC: 2.2 MG/DL
MAN DIFF?: NORMAL
MCHC RBC-ENTMCNC: 32.7 PG
MCHC RBC-ENTMCNC: 32.8 GM/DL
MCV RBC AUTO: 99.8 FL
MONOCYTES # BLD AUTO: 0.29 K/UL
MONOCYTES NFR BLD AUTO: 7.2 %
NEUTROPHILS # BLD AUTO: 2.07 K/UL
NEUTROPHILS NFR BLD AUTO: 51.5 %
PARATHYROID HORMONE INTACT: 18 PG/ML
PHOSPHATE SERPL-MCNC: 3.6 MG/DL
PLATELET # BLD AUTO: 691 K/UL
POTASSIUM SERPL-SCNC: 4.4 MMOL/L
PROT SERPL-MCNC: 7.1 G/DL
PROT SERPL-MCNC: 7.3 G/DL
PROT SERPL-MCNC: 7.3 G/DL
RBC # BLD: 4.1 M/UL
RBC # FLD: 13.2 %
SODIUM SERPL-SCNC: 140 MMOL/L
WBC # FLD AUTO: 4.02 K/UL

## 2023-04-20 ENCOUNTER — APPOINTMENT (OUTPATIENT)
Dept: PULMONOLOGY | Facility: CLINIC | Age: 78
End: 2023-04-20
Payer: MEDICAID

## 2023-04-20 VITALS
HEIGHT: 60 IN | RESPIRATION RATE: 17 BRPM | DIASTOLIC BLOOD PRESSURE: 58 MMHG | OXYGEN SATURATION: 98 % | HEART RATE: 82 BPM | SYSTOLIC BLOOD PRESSURE: 114 MMHG | WEIGHT: 118 LBS | BODY MASS INDEX: 23.16 KG/M2

## 2023-04-20 DIAGNOSIS — R05.9 COUGH, UNSPECIFIED: ICD-10-CM

## 2023-04-20 DIAGNOSIS — Z22.7 LATENT TUBERCULOSIS: ICD-10-CM

## 2023-04-20 PROCEDURE — 99213 OFFICE O/P EST LOW 20 MIN: CPT

## 2023-04-20 NOTE — HISTORY OF PRESENT ILLNESS
[Never] : never [TextBox_4] : 76F PMH positive IGRA, s/p treatment for LTBI, HTN, HLD, CVA with residual R-sided weakness, hypothyroidism, macular degeneration, RA who presents for f/u visit. She reports no SOB. No fevers, no chills, no nightsweats. No N/V/D. She completed her treatment of LTBI.

## 2023-04-21 ENCOUNTER — APPOINTMENT (OUTPATIENT)
Dept: PULMONOLOGY | Facility: CLINIC | Age: 78
End: 2023-04-21

## 2023-04-29 ENCOUNTER — APPOINTMENT (OUTPATIENT)
Dept: CT IMAGING | Facility: CLINIC | Age: 78
End: 2023-04-29

## 2023-05-01 ENCOUNTER — NON-APPOINTMENT (OUTPATIENT)
Age: 78
End: 2023-05-01

## 2023-05-01 LAB
BASOPHILS # BLD AUTO: 0.08 K/UL
BASOPHILS NFR BLD AUTO: 1.9 %
EOSINOPHIL # BLD AUTO: 0.34 K/UL
EOSINOPHIL NFR BLD AUTO: 8 %
HCT VFR BLD CALC: 36.9 %
HGB BLD-MCNC: 12.5 G/DL
IMM GRANULOCYTES NFR BLD AUTO: 0 %
LYMPHOCYTES # BLD AUTO: 1.45 K/UL
LYMPHOCYTES NFR BLD AUTO: 34 %
MAN DIFF?: NORMAL
MCHC RBC-ENTMCNC: 32.9 PG
MCHC RBC-ENTMCNC: 33.9 GM/DL
MCV RBC AUTO: 97.1 FL
MONOCYTES # BLD AUTO: 0.38 K/UL
MONOCYTES NFR BLD AUTO: 8.9 %
NEUTROPHILS # BLD AUTO: 2.02 K/UL
NEUTROPHILS NFR BLD AUTO: 47.2 %
PLATELET # BLD AUTO: 199 K/UL
RBC # BLD: 3.8 M/UL
RBC # FLD: 12.9 %
WBC # FLD AUTO: 4.27 K/UL

## 2023-05-08 ENCOUNTER — OFFICE (OUTPATIENT)
Dept: URBAN - METROPOLITAN AREA CLINIC 94 | Facility: CLINIC | Age: 78
Setting detail: OPHTHALMOLOGY
End: 2023-05-08
Payer: MEDICAID

## 2023-05-08 DIAGNOSIS — H35.3231: ICD-10-CM

## 2023-05-08 DIAGNOSIS — H43.813: ICD-10-CM

## 2023-05-08 DIAGNOSIS — H35.033: ICD-10-CM

## 2023-05-08 DIAGNOSIS — H35.373: ICD-10-CM

## 2023-05-08 PROCEDURE — 92134 CPTRZ OPH DX IMG PST SGM RTA: CPT | Performed by: SPECIALIST

## 2023-05-08 PROCEDURE — 92012 INTRM OPH EXAM EST PATIENT: CPT | Performed by: SPECIALIST

## 2023-05-08 ASSESSMENT — REFRACTION_AUTOREFRACTION
OD_SPHERE: +0.25
OS_CYLINDER: -1.25
OS_AXIS: 095
OS_SPHERE: 0.00
OD_AXIS: 090
OD_CYLINDER: -1.75

## 2023-05-08 ASSESSMENT — CONFRONTATIONAL VISUAL FIELD TEST (CVF)
OS_FINDINGS: FULL
OD_FINDINGS: FULL

## 2023-05-08 ASSESSMENT — CORNEAL EDEMA CLINICAL DESCRIPTION: OD_CORNEALEDEMA: T

## 2023-05-08 ASSESSMENT — VISUAL ACUITY
OS_BCVA: 20/40-
OD_BCVA: 20/50-

## 2023-05-08 ASSESSMENT — KERATOMETRY
OD_K1POWER_DIOPTERS: 40.50
OS_K1POWER_DIOPTERS: 42.25
OD_AXISANGLE_DEGREES: 010
OD_K2POWER_DIOPTERS: 43.50
OS_AXISANGLE_DEGREES: 180
OS_K2POWER_DIOPTERS: 43.50

## 2023-05-08 ASSESSMENT — AXIALLENGTH_DERIVED
OS_AL: 24.0747
OD_AL: 24.4141

## 2023-05-08 ASSESSMENT — TONOMETRY
OD_IOP_MMHG: 12
OS_IOP_MMHG: 16

## 2023-05-08 ASSESSMENT — SPHEQUIV_DERIVED
OS_SPHEQUIV: -0.625
OD_SPHEQUIV: -0.625

## 2023-05-20 ENCOUNTER — APPOINTMENT (OUTPATIENT)
Dept: CT IMAGING | Facility: CLINIC | Age: 78
End: 2023-05-20
Payer: MEDICAID

## 2023-05-20 ENCOUNTER — OUTPATIENT (OUTPATIENT)
Dept: OUTPATIENT SERVICES | Facility: HOSPITAL | Age: 78
LOS: 1 days | End: 2023-05-20

## 2023-05-20 DIAGNOSIS — R76.12 NONSPECIFIC REACTION TO CELL MEDIATED IMMUNITY MEASUREMENT OF GAMMA INTERFERON ANTIGEN RESPONSE WITHOUT ACTIVE TUBERCULOSIS: ICD-10-CM

## 2023-05-20 PROCEDURE — 71250 CT THORAX DX C-: CPT | Mod: 26

## 2023-05-26 ENCOUNTER — APPOINTMENT (OUTPATIENT)
Dept: RHEUMATOLOGY | Facility: CLINIC | Age: 78
End: 2023-05-26
Payer: MEDICAID

## 2023-05-26 VITALS
HEART RATE: 77 BPM | OXYGEN SATURATION: 98 % | DIASTOLIC BLOOD PRESSURE: 60 MMHG | RESPIRATION RATE: 17 BRPM | WEIGHT: 118 LBS | SYSTOLIC BLOOD PRESSURE: 110 MMHG | TEMPERATURE: 98 F | BODY MASS INDEX: 23.16 KG/M2 | HEIGHT: 60 IN

## 2023-05-26 PROCEDURE — 99214 OFFICE O/P EST MOD 30 MIN: CPT

## 2023-05-26 RX ORDER — ZOLEDRONIC ACID 5 MG/100ML
5 INJECTION INTRAVENOUS
Qty: 1 | Refills: 0 | Status: ACTIVE | COMMUNITY
Start: 2022-05-23

## 2023-05-26 RX ORDER — ELECTROLYTES/DEXTROSE
100 SOLUTION, ORAL ORAL DAILY
Qty: 30 | Refills: 8 | Status: DISCONTINUED | COMMUNITY
Start: 2022-03-21 | End: 2023-05-26

## 2023-05-26 RX ORDER — ISONIAZID 300 MG/1
300 TABLET ORAL DAILY
Qty: 30 | Refills: 8 | Status: DISCONTINUED | COMMUNITY
Start: 2022-03-21 | End: 2023-05-26

## 2023-05-26 RX ORDER — MULTIVITAMIN/IRON/FOLIC ACID 18MG-0.4MG
600-400 TABLET ORAL
Qty: 60 | Refills: 5 | Status: DISCONTINUED | COMMUNITY
Start: 2022-05-23 | End: 2023-05-26

## 2023-05-26 NOTE — ASSESSMENT
[FreeTextEntry1] : 76 yo woman with macular degeneration, HTN, HLD, hypothyroid,  stroke R sided weakness, seropositive RA , Hep B core positive ( being monitored with VL)  , quantiferon positive LTB ( s/p 9 months INH) , CCT with an opacity that will be monitored by pulm ,OP with multiple fractures ( on reclast).  seropositive RA , on MTX and FA and doing better\par \par --Reclast next  6/22/2023.  \par --next dexa in 1 year 4/2024\par --cont calcium  and vitamin D \par --RA cont MTX with FA doing well CDAI 4 \par --abnormal CCT: follow up with pulm, \par --quantiferon positive treated for LTB :s/p  9 months of INH and vit B6. \par \par

## 2023-05-26 NOTE — PHYSICAL EXAM
[General Appearance - Well Nourished] : well nourished [General Appearance - Well Developed] : well developed [Sclera] : the sclera and conjunctiva were normal [Hearing Threshold Finger Rub Not [place]] : hearing was normal [Nail Clubbing] : no clubbing  or cyanosis of the fingernails [Musculoskeletal - Swelling] : no joint swelling seen [Motor Tone] : muscle strength and tone were normal [] : no rash [Skin Lesions] : no skin lesions [Affect] : the affect was normal [Mood] : the mood was normal

## 2023-05-26 NOTE — ASSESSMENT
[FreeTextEntry1] : 78 yo woman with macular degeneration, HTN, HLD, hypothyroid,  stroke R sided weakness, seropositive RA , Hep B core positive ( being monitored with VL)  , quantiferon positive ( s/p 9 months INH) , CCT with an opacity that will be monitored by pulm ,OP with multiple fractures ( on reclast).  RA , on MTX and FA and doing better\par \par --Reclast last given 6/21/2022.  \par --repeat DEXA\par --cont calcium  and vitamin D \par --RA cont MTX with FA doing well CDAI 4 \par --abnormal CCT: follow up with pulm, repeat CCT 3/2023\par --quantiferon positive:s/p  9 months of INH and vit B6. \par \par

## 2023-05-26 NOTE — HISTORY OF PRESENT ILLNESS
[FreeTextEntry1] : 76 yo woman with history macular degeneration,  HTN, HLD, Stroke with residual right sided weakness, hypothyroidism and osteoporosis with multiple fracture ( wrist/compression fracture/pelvic-all considered osteoporotic fx) and seropositive RA\par she also has quantiferon + with most likley LTB s/p INH Rx for 9 months and Hep B+ () being monitored with Hep b VL) .  In addition RUL ill defines opacity that needs monitoring \par \par osteoporosis: multiple fractures. previously on alendronate and prolia started in 2018.  she had 4 prolia injection.  she states that she experiences severe body pain after the prolia that last about 3 days. she also had a rash with prolia.  she does not want to continue prolia.    patient had DEXA 10/2020 showing T score L1-L4 -3.0  ( from -3.6 in 2016) and femoral neck -2.6 ( stable from 2016).  \par patient received reclast 3/16/2021, 6/21/2022\par taking calcium and vit d \par last dexa 10/2020\par \par seropositive RA : patient has macular degeneration can not start HCQ.  she is noted to be positive quantiferon and has RUL ill defines opacity on CCT.  she denies any sob or cough, wt loss , night sweats. . she saw pulm and will have a repeat CCT 3/2023. she also noted to have hep b core positive and no VL. Saw GI and note states that she can either consider ppx or monitor VL while on immunosupression.    \par \par patient is on MTX and FA and denies any joint swelling.  she has minimal morning stiffness on some days.  she is on calcium and vit d for OP. she had reclast 6/2022 and tolerated this well.  she done with  INH and B6 \par she is hep B core positive and her recent VL was negative \par she also has an abnormal CCT which is being monitored.  she will need a f/u CCT 3/2023.  patient denies any wt loss, cough or sob     \par \par she has dry eye and stable alopecia \par no cp/sob/cough, facial rash or oral lesions.  no history of serositis or low plts.  possible low wbc in the past??  \par \par \par CCP>250\par negative MONE/ro/la \par CPK normal \par crp normal \par esr 21\par \par hand xray: T old healed radial metaphyseal Fx deformity with ulnar variance.  carpal bone normal, joint space preserved and no erosions \par CCT: ill defines linear opacity RUL most likley represent an area of scarring\par

## 2023-05-26 NOTE — HISTORY OF PRESENT ILLNESS
[FreeTextEntry1] : 76 yo woman with history macular degeneration,  HTN, HLD, Stroke with residual right sided weakness, hypothyroidism and osteoporosis with multiple fracture ( wrist/compression fracture/pelvic-all considered osteoporotic fx) and seropositive RA\par she also has quantiferon + with most likley LTB s/p INH Rx for 9 months and Hep B+ () being monitored with Hep b VL) .  In addition RUL ill defines opacity that needs monitoring \par \par osteoporosis: multiple fractures. previously on alendronate and prolia started in 2018.  she had 4 prolia injection.  she states that she experiences severe body pain after the prolia that last about 3 days. she also had a rash with prolia.  she does not want to continue prolia.    patient had DEXA 10/2020 showing T score L1-L4 -3.0  ( from -3.6 in 2016) and femoral neck -2.6 ( stable from 2016).  \par dexa 4/2023 spine TS -3.2 , FN -3 ( some worsening while on reclast.  she declines prolia and not very amecable to daily injections either \par patient received reclast 3/16/2021, 6/21/2022\par taking calcium and vit d \par last dexa 4/2023\par \par seropositive RA : patient has macular degeneration can not start HCQ.  she is noted to be positive quantiferon and has RUL ill defines opacity on CCT. recently repeat CCT and awaiting final results and pulm input.   she denies any sob or cough, wt loss , night sweats.  she also noted to have hep b core positive and no VL. Saw GI and note states that she can either consider ppx or monitor VL while on immunosuppression.    \par \par patient is on MTX 10mg weekly and FA and denies any joint swelling.  she has minimal morning stiffness on some days.  overall doing better.  she is able to work around the house and take care of her on cleaning.  she could not do this before.  \par \par she is hep B core positive and her recent VL was negative \par she also has an abnormal CCT which is being monitored.  she will need a f/u CCT 3/2023.  patient denies any wt loss, cough or sob     \par \par she has dry eye and stable alopecia \par no cp/sob/cough, facial rash or oral lesions.  no history of serositis or low plts.  possible low wbc in the past??  \par \par \par CCP>250\par negative MONE/ro/la \par CPK normal \par crp normal \par esr 21\par \par hand xray: T old healed radial metaphyseal Fx deformity with ulnar variance.  carpal bone normal, joint space preserved and no erosions \par CCT: ill defines linear opacity RUL most likley represent an area of scarring\par

## 2023-05-26 NOTE — PHYSICAL EXAM
[General Appearance - Well Nourished] : well nourished [General Appearance - Well Developed] : well developed [Sclera] : the sclera and conjunctiva were normal [Hearing Threshold Finger Rub Not [place]] : hearing was normal [Nail Clubbing] : no clubbing  or cyanosis of the fingernails [Musculoskeletal - Swelling] : no joint swelling seen [Motor Tone] : muscle strength and tone were normal [] : no rash [Skin Lesions] : no skin lesions [Affect] : the affect was normal [Mood] : the mood was normal

## 2023-05-26 NOTE — PHYSICAL EXAM
[General Appearance - Well Nourished] : well nourished [General Appearance - Well Developed] : well developed [Sclera] : the sclera and conjunctiva were normal [Hearing Threshold Finger Rub Not [place]] : hearing was normal [Nail Clubbing] : no clubbing  or cyanosis of the fingernails [Musculoskeletal - Swelling] : no joint swelling seen [Motor Tone] : muscle strength and tone were normal [] : no rash [Skin Lesions] : no skin lesions [Affect] : the affect was normal [Mood] : the mood was normal

## 2023-07-04 NOTE — HISTORY OF PRESENT ILLNESS
[FreeTextEntry1] : 76 yo woman with history macular degeneration,  HTN, HLD, Stroke with residual right sided weakness, hypothyroidism and osteoporosis with multiple fracture ( wrist/compression fracture/pelvic-all considered osteoporotic fx) previously on alendronate and prolia started in 2018.  she had 4 prolia injection.  she states that she experiences severe body pain after the prolia that last about 3 days. she also had a rash with prolia.  she does not want to continue prolia.    patient had DEXA 10/2020 showing T score L1-L4 -3.0  ( from -3.6 in 2016) and femoral neck -2.6 ( stable from 2016) \par \par patient denies any smoking or alcohol. no history of RA or steroid use.  mother with history of fractures.  she has a history of hypothyroidism.  \par \par patient is taking calcium and vit d.  \par \par \par  patient received reclast 3/16/2021.\par \par today: patient complains of morning stiffness and body pain.  denies any swelling. patient has to do a routine exercise in order to be able to move.  this has been going for now 1 year. patient on labs tested is noted CCP>250 and , ESR 21.  patient tried prednisone and states that although she continues to have morning stiffness and body pain this is much better when compared to before.  patient is followed by eye clinic for macular degeneration \par \par she has dry eye and stable alopecia \par no cp/sob/cough, facial rash or oral lesions.  no history of serositis or low plts.  possible low wbc in the past??  \par \par CPK normal \par crp normal \par esr 21\par \par CCP>250\par negative MONE/ro/la \par \par hand xray: T old healed radial metaphyseal Fx deformity with ulnar variance.  carpal bone normal, joint space preserved and no erosions \par 
Labs/EKG/Imaging Studies/Medications

## 2023-07-20 ENCOUNTER — OFFICE (OUTPATIENT)
Dept: URBAN - METROPOLITAN AREA CLINIC 94 | Facility: CLINIC | Age: 78
Setting detail: OPHTHALMOLOGY
End: 2023-07-20
Payer: MEDICAID

## 2023-07-20 ENCOUNTER — RX ONLY (RX ONLY)
Age: 78
End: 2023-07-20

## 2023-07-20 DIAGNOSIS — H43.813: ICD-10-CM

## 2023-07-20 DIAGNOSIS — H35.373: ICD-10-CM

## 2023-07-20 DIAGNOSIS — H35.033: ICD-10-CM

## 2023-07-20 DIAGNOSIS — H35.3231: ICD-10-CM

## 2023-07-20 PROCEDURE — 92134 CPTRZ OPH DX IMG PST SGM RTA: CPT | Performed by: SPECIALIST

## 2023-07-20 PROCEDURE — 92012 INTRM OPH EXAM EST PATIENT: CPT | Performed by: SPECIALIST

## 2023-07-20 ASSESSMENT — KERATOMETRY
OD_K2POWER_DIOPTERS: 43.50
OS_K2POWER_DIOPTERS: 43.50
OS_AXISANGLE_DEGREES: 180
OD_K1POWER_DIOPTERS: 40.50
OD_AXISANGLE_DEGREES: 010
OS_K1POWER_DIOPTERS: 42.25

## 2023-07-20 ASSESSMENT — VISUAL ACUITY
OD_BCVA: 20/50-1
OS_BCVA: 20/40

## 2023-07-20 ASSESSMENT — AXIALLENGTH_DERIVED
OD_AL: 24.4141
OS_AL: 24.0747

## 2023-07-20 ASSESSMENT — CONFRONTATIONAL VISUAL FIELD TEST (CVF)
OS_FINDINGS: FULL
OD_FINDINGS: FULL

## 2023-07-20 ASSESSMENT — REFRACTION_AUTOREFRACTION
OS_SPHERE: 0.00
OS_AXIS: 095
OD_CYLINDER: -1.75
OD_SPHERE: +0.25
OS_CYLINDER: -1.25
OD_AXIS: 090

## 2023-07-20 ASSESSMENT — TONOMETRY
OS_IOP_MMHG: 16
OD_IOP_MMHG: 14

## 2023-07-20 ASSESSMENT — SPHEQUIV_DERIVED
OS_SPHEQUIV: -0.625
OD_SPHEQUIV: -0.625

## 2023-07-20 ASSESSMENT — CORNEAL EDEMA CLINICAL DESCRIPTION: OD_CORNEALEDEMA: T

## 2023-07-31 ENCOUNTER — APPOINTMENT (OUTPATIENT)
Dept: RHEUMATOLOGY | Facility: CLINIC | Age: 78
End: 2023-07-31
Payer: MEDICAID

## 2023-07-31 VITALS
TEMPERATURE: 98.1 F | DIASTOLIC BLOOD PRESSURE: 65 MMHG | OXYGEN SATURATION: 99 % | HEART RATE: 61 BPM | RESPIRATION RATE: 17 BRPM | SYSTOLIC BLOOD PRESSURE: 113 MMHG

## 2023-07-31 VITALS
DIASTOLIC BLOOD PRESSURE: 71 MMHG | HEART RATE: 71 BPM | TEMPERATURE: 97.8 F | SYSTOLIC BLOOD PRESSURE: 119 MMHG | RESPIRATION RATE: 17 BRPM | OXYGEN SATURATION: 98 %

## 2023-07-31 PROCEDURE — 96374 THER/PROPH/DIAG INJ IV PUSH: CPT

## 2023-07-31 RX ORDER — ZOLEDRONIC ACID 5 MG/100ML
5 INJECTION INTRAVENOUS
Qty: 0 | Refills: 0 | Status: COMPLETED | OUTPATIENT
Start: 2023-07-20

## 2023-07-31 NOTE — HISTORY OF PRESENT ILLNESS
[Denies] : Denies [No] : No [Yes] : Yes [Declined] : Declined [Informed consent documented in EHR.] : Informed consent documented in EHR. [Creatinine] : creatinine [GFR] : glomerular filtration rate [Left upper extremity] : Left upper extremity [24g] : 24g [Start Time: ___] : Medication Start Time: [unfilled] [End Time: ___] : Medication End Time: [unfilled] [IV discontinued. Intact. No signs or symptoms of IV complications noted. Time: ___] : IV discontinued. Intact. No signs or symptoms of IV complications noted. Time: [unfilled] [Patient  instructed to seek medical attention with signs and symptoms of adverse effects] : Patient  instructed to seek medical attention with signs and symptoms of adverse effects [Patient left unit in no acute distress] : Patient left unit in no acute distress [Medications administered as ordered and tolerated well.] : Medications administered as ordered and tolerated well. [de-identified] : Forearm [de-identified] : Pt denied dental work in the past 2 months, or getting it in the next 3 months.

## 2023-08-09 LAB
ALBUMIN SERPL ELPH-MCNC: 4.3 G/DL
ALP BLD-CCNC: 76 U/L
ALT SERPL-CCNC: 12 U/L
ANION GAP SERPL CALC-SCNC: 11 MMOL/L
AST SERPL-CCNC: 17 U/L
BILIRUB SERPL-MCNC: 0.4 MG/DL
BUN SERPL-MCNC: 15 MG/DL
CALCIUM SERPL-MCNC: 9.2 MG/DL
CHLORIDE SERPL-SCNC: 107 MMOL/L
CO2 SERPL-SCNC: 24 MMOL/L
CREAT SERPL-MCNC: 0.67 MG/DL
CRP SERPL-MCNC: <3 MG/L
EGFR: 89 ML/MIN/1.73M2
GLUCOSE SERPL-MCNC: 95 MG/DL
HEPB DNA PCR INT: NOT DETECTED
HEPB DNA PCR LOG: NOT DETECTED LOGIU/ML
POTASSIUM SERPL-SCNC: 4.4 MMOL/L
PROT SERPL-MCNC: 6.4 G/DL
SODIUM SERPL-SCNC: 141 MMOL/L

## 2023-09-27 NOTE — PATIENT PROFILE ADULT - FLU SEASON?
Advise UA; as clean catch as possible   Patient has already arrived for her In Office visit with Dr Johnson   Labs orders will be determined/ordered during her office visit     Yes...

## 2023-10-20 NOTE — ED ADULT NURSE NOTE - NSSISCREENINGQ2_ED_A_ED
CT code triggered ABN please try another code and radiology was not sure what code would work. You can call Lisa Milner or anyone who answers in scheduling if there are any questions.  [phone] No

## 2023-11-06 ENCOUNTER — OFFICE (OUTPATIENT)
Dept: URBAN - METROPOLITAN AREA CLINIC 94 | Facility: CLINIC | Age: 78
Setting detail: OPHTHALMOLOGY
End: 2023-11-06
Payer: MEDICAID

## 2023-11-06 DIAGNOSIS — H35.033: ICD-10-CM

## 2023-11-06 DIAGNOSIS — H43.813: ICD-10-CM

## 2023-11-06 DIAGNOSIS — H35.3231: ICD-10-CM

## 2023-11-06 DIAGNOSIS — H35.373: ICD-10-CM

## 2023-11-06 DIAGNOSIS — H35.3211: ICD-10-CM

## 2023-11-06 PROCEDURE — 67028 INJECTION EYE DRUG: CPT | Mod: RT | Performed by: SPECIALIST

## 2023-11-06 PROCEDURE — 92134 CPTRZ OPH DX IMG PST SGM RTA: CPT | Performed by: SPECIALIST

## 2023-11-06 PROCEDURE — 92235 FLUORESCEIN ANGRPH MLTIFRAME: CPT | Performed by: SPECIALIST

## 2023-11-06 ASSESSMENT — REFRACTION_AUTOREFRACTION
OS_CYLINDER: -1.25
OD_CYLINDER: -1.75
OS_AXIS: 095
OD_SPHERE: +0.25
OD_AXIS: 090
OS_SPHERE: 0.00

## 2023-11-06 ASSESSMENT — CONFRONTATIONAL VISUAL FIELD TEST (CVF)
OD_FINDINGS: FULL
OS_FINDINGS: FULL

## 2023-11-06 ASSESSMENT — CORNEAL EDEMA CLINICAL DESCRIPTION: OD_CORNEALEDEMA: T

## 2023-11-06 ASSESSMENT — SPHEQUIV_DERIVED
OS_SPHEQUIV: -0.625
OD_SPHEQUIV: -0.625

## 2023-11-27 ENCOUNTER — OFFICE (OUTPATIENT)
Dept: URBAN - METROPOLITAN AREA CLINIC 94 | Facility: CLINIC | Age: 78
Setting detail: OPHTHALMOLOGY
End: 2023-11-27
Payer: MEDICAID

## 2023-11-27 DIAGNOSIS — H43.813: ICD-10-CM

## 2023-11-27 DIAGNOSIS — H35.3231: ICD-10-CM

## 2023-11-27 DIAGNOSIS — H35.033: ICD-10-CM

## 2023-11-27 DIAGNOSIS — H35.373: ICD-10-CM

## 2023-11-27 PROCEDURE — 92012 INTRM OPH EXAM EST PATIENT: CPT | Performed by: SPECIALIST

## 2023-11-27 PROCEDURE — 92134 CPTRZ OPH DX IMG PST SGM RTA: CPT | Performed by: SPECIALIST

## 2023-11-27 ASSESSMENT — REFRACTION_AUTOREFRACTION
OD_CYLINDER: -1.75
OD_SPHERE: +0.25
OD_AXIS: 090
OS_SPHERE: 0.00
OS_CYLINDER: -1.25
OS_AXIS: 095

## 2023-11-27 ASSESSMENT — CONFRONTATIONAL VISUAL FIELD TEST (CVF)
OD_FINDINGS: FULL
OS_FINDINGS: FULL

## 2023-11-27 ASSESSMENT — SPHEQUIV_DERIVED
OS_SPHEQUIV: -0.625
OD_SPHEQUIV: -0.625

## 2023-11-27 ASSESSMENT — CORNEAL EDEMA CLINICAL DESCRIPTION: OD_CORNEALEDEMA: T

## 2024-01-08 ENCOUNTER — OFFICE (OUTPATIENT)
Dept: URBAN - METROPOLITAN AREA CLINIC 94 | Facility: CLINIC | Age: 79
Setting detail: OPHTHALMOLOGY
End: 2024-01-08
Payer: MEDICAID

## 2024-01-08 DIAGNOSIS — H35.3231: ICD-10-CM

## 2024-01-08 DIAGNOSIS — H35.373: ICD-10-CM

## 2024-01-08 DIAGNOSIS — H35.033: ICD-10-CM

## 2024-01-08 DIAGNOSIS — H43.813: ICD-10-CM

## 2024-01-08 PROCEDURE — 92134 CPTRZ OPH DX IMG PST SGM RTA: CPT | Performed by: SPECIALIST

## 2024-01-08 PROCEDURE — 92012 INTRM OPH EXAM EST PATIENT: CPT | Performed by: SPECIALIST

## 2024-01-08 ASSESSMENT — REFRACTION_AUTOREFRACTION
OS_AXIS: 095
OS_SPHERE: 0.00
OD_AXIS: 090
OD_SPHERE: +0.25
OS_CYLINDER: -1.25
OD_CYLINDER: -1.75

## 2024-01-08 ASSESSMENT — SPHEQUIV_DERIVED
OD_SPHEQUIV: -0.625
OS_SPHEQUIV: -0.625

## 2024-01-08 ASSESSMENT — CONFRONTATIONAL VISUAL FIELD TEST (CVF)
OD_FINDINGS: FULL
OS_FINDINGS: FULL

## 2024-01-08 ASSESSMENT — CORNEAL EDEMA CLINICAL DESCRIPTION: OD_CORNEALEDEMA: T

## 2024-01-22 ENCOUNTER — APPOINTMENT (OUTPATIENT)
Dept: RHEUMATOLOGY | Facility: CLINIC | Age: 79
End: 2024-01-22
Payer: MEDICAID

## 2024-01-22 VITALS
RESPIRATION RATE: 17 BRPM | SYSTOLIC BLOOD PRESSURE: 126 MMHG | BODY MASS INDEX: 23.56 KG/M2 | WEIGHT: 120 LBS | HEIGHT: 60 IN | TEMPERATURE: 98 F | DIASTOLIC BLOOD PRESSURE: 64 MMHG | HEART RATE: 76 BPM | OXYGEN SATURATION: 97 %

## 2024-01-22 DIAGNOSIS — Z00.00 ENCOUNTER FOR GENERAL ADULT MEDICAL EXAMINATION W/OUT ABNORMAL FINDINGS: ICD-10-CM

## 2024-01-22 PROCEDURE — 99214 OFFICE O/P EST MOD 30 MIN: CPT

## 2024-01-22 PROCEDURE — 36415 COLL VENOUS BLD VENIPUNCTURE: CPT

## 2024-01-22 NOTE — ASSESSMENT
[FreeTextEntry1] : 77 yo woman with macular degeneration, HTN, HLD, hypothyroid, stroke R sided weakness, seropositive RA , Hep B core positive ( being monitored with VL) , quantiferon positive LTB ( s/p 9 months INH) , CCT with an nodule that is unchanged, OP with multiple fractures ( on reclast). seropositive RA , on MTX and FA and doing well, CDAI 4   --Reclast next 7/31/2024 (consider tymlos)   --last dexa in 1 year 4/2024  --cont calcium and vitamin D  --RA cont MTX with FA doing well CDAI 4  --abnormal CCT: last checked 5/2023 unchanged   --quantiferon positive treated for LTB :s/p 9 months of INH and vit B6. --hep b core positive: check VL every 6 months

## 2024-01-22 NOTE — HISTORY OF PRESENT ILLNESS
[FreeTextEntry1] : 77 yo woman with history macular degeneration, HTN, HLD, Stroke with residual right sided weakness, hypothyroidism and osteoporosis with multiple fracture ( wrist/compression fracture/pelvic-all considered osteoporotic fx) and seropositive RA she also has quantiferon + with most likley LTB s/p INH Rx for 9 months and Hep B core total positive () being monitored with Hep b VL) .  In addition RUL ill defines opacity that needs monitoring   osteoporosis: multiple fractures. previously on alendronate and prolia started in 2018.  she had 4 prolia injection.  she states that she experiences severe body pain after the prolia that last about 3 days. she also had a rash with prolia.  she does not want to continue prolia.    patient had DEXA 10/2020 showing T score L1-L4 -3.0  ( from -3.6 in 2016) and femoral neck -2.6 ( stable from 2016).    DEXA: 4/1/2023: spine -3.2, FN -3 ( was doing better on prolia but declines going back on prolia)  patient received reclast 3/16/2021, 6/21/2022, 7/31/2023 taking calcium and vit d  last dexa 10/2020   patient has macular degeneration can not start HCQ.  she is noted to be positive quantiferon and has RUL ill defines opacity on CCT.  had repeat CCT 5/2023  stable  5mm RUL pulm nodule and ELENA linear focus of atelectasis or scarring( unchanged).  she denies any sob or cough, wt loss , night sweats. .. she also noted to have hep b core positive and no VL. Saw GI and note states that she can either consider ppx or monitor VL while on immunosupression.      TODAY: patient is on MTX and FA and denies any joint swelling.  she has minimal morning stiffness on some days.  she is on calcium and vit d for OP. she had reclast 7/31/2023 and tolerated this well.   she is hep B core positive and her recent VL was negative  she also has an abnormal CCT but these changes are stable and unchanged   she has dry eye and stable alopecia.  no cp/sob/cough, facial rash or oral lesions.  no history of serositis or low plts.  possible low wbc in the past??     CCP>250 negative MONE/ro/la  CPK normal  crp normal  esr 21  hand xray: T old healed radial metaphyseal Fx deformity with ulnar variance.  carpal bone normal, joint space preserved and no erosions  CCT: ill defines linear opacity RUL most likley represent an area of scarring

## 2024-01-22 NOTE — PHYSICAL EXAM
[General Appearance - Well Nourished] : well nourished [General Appearance - Well Developed] : well developed [Sclera] : the sclera and conjunctiva were normal [Hearing Threshold Finger Rub Not [place]] : hearing was normal [Nail Clubbing] : no clubbing  or cyanosis of the fingernails [Musculoskeletal - Swelling] : no joint swelling seen [Motor Tone] : muscle strength and tone were normal [] : no rash [Skin Lesions] : no skin lesions [Affect] : the affect was normal [Mood] : the mood was normal

## 2024-01-26 LAB
25(OH)D3 SERPL-MCNC: 50.9 NG/ML
ALBUMIN SERPL ELPH-MCNC: 4.5 G/DL
ALP BLD-CCNC: 68 U/L
ALT SERPL-CCNC: 17 U/L
ANION GAP SERPL CALC-SCNC: 12 MMOL/L
AST SERPL-CCNC: 19 U/L
BILIRUB SERPL-MCNC: 0.2 MG/DL
BUN SERPL-MCNC: 15 MG/DL
CALCIUM SERPL-MCNC: 8.7 MG/DL
CHLORIDE SERPL-SCNC: 106 MMOL/L
CO2 SERPL-SCNC: 24 MMOL/L
CREAT SERPL-MCNC: 0.86 MG/DL
CRP SERPL-MCNC: <3 MG/L
EGFR: 69 ML/MIN/1.73M2
ERYTHROCYTE [SEDIMENTATION RATE] IN BLOOD BY WESTERGREN METHOD: 36 MM/HR
GLUCOSE SERPL-MCNC: 113 MG/DL
HCT VFR BLD CALC: 37.1 %
HEPB DNA PCR INT: NOT DETECTED
HEPB DNA PCR LOG: NOT DETECTED LOGIU/ML
HGB BLD-MCNC: 12.6 G/DL
MCHC RBC-ENTMCNC: 33.2 PG
MCHC RBC-ENTMCNC: 34 GM/DL
MCV RBC AUTO: 97.9 FL
PLATELET # BLD AUTO: 206 K/UL
POTASSIUM SERPL-SCNC: 4 MMOL/L
PROT SERPL-MCNC: 7 G/DL
RBC # BLD: 3.79 M/UL
RBC # FLD: 12.8 %
SODIUM SERPL-SCNC: 142 MMOL/L
WBC # FLD AUTO: 4.77 K/UL

## 2024-02-26 ENCOUNTER — APPOINTMENT (OUTPATIENT)
Dept: NEUROLOGY | Facility: CLINIC | Age: 79
End: 2024-02-26
Payer: MEDICAID

## 2024-02-26 VITALS
WEIGHT: 120 LBS | BODY MASS INDEX: 23.56 KG/M2 | SYSTOLIC BLOOD PRESSURE: 126 MMHG | DIASTOLIC BLOOD PRESSURE: 70 MMHG | HEIGHT: 60 IN

## 2024-02-26 PROCEDURE — G2211 COMPLEX E/M VISIT ADD ON: CPT | Mod: NC,1L

## 2024-02-26 PROCEDURE — 99213 OFFICE O/P EST LOW 20 MIN: CPT

## 2024-02-26 NOTE — HISTORY OF PRESENT ILLNESS
[FreeTextEntry1] : I saw this patient in the office today.  As you recall, on 9/24/17 she developed right-sided weakness. This lasted about 15 minutes and resolved. She had a second episode a few hours later which also resolved. A few hours after that she has her episode at which persisted. She was in [place] at the time. She was taken to a hospital where she spent 3 days. Workup revealed a stroke and she was ultimately discharged with instructions for physical therapy.  She continues to have right-sided weakness.  The arm is involved more than the leg. It is with her daily. It is now mild. There is some associated sensory loss on the right side. There is no associated aphasia.  2/26/2024 visit: She has had no new symptoms since her last visit.

## 2024-02-26 NOTE — DATA REVIEWED
[de-identified] : The patient's daughter brought a page from her CT scan from [place]. \par  The study demonstrated a small infarct in the left basal ganglia/internal capsule.\par  \par  Carotid ultrasounds and transcranial Dopplers were unremarkable in my office on 1/18/18.

## 2024-02-26 NOTE — ASSESSMENT
[FreeTextEntry1] : This is a 78-year-old woman who is status post stroke. Her examination remains stable.  I have recommended she continue antiplatelet and statin medication.  Her blood pressure is well controlled on her current regimen.   I will see her back in one year.

## 2024-02-26 NOTE — PHYSICAL EXAM
[General Appearance - Alert] : alert [General Appearance - In No Acute Distress] : in no acute distress [Oriented To Time, Place, And Person] : oriented to person, place, and time [Affect] : the affect was normal [Memory Recent] : recent memory was not impaired [Memory Remote] : remote memory was not impaired [Aphasia] : no dysphasia/aphasia [Cranial Nerves Optic (II)] : visual acuity intact bilaterally,  visual fields full to confrontation, pupils equal round and reactive to light [Cranial Nerves Trigeminal (V)] : facial sensation intact symmetrically [Cranial Nerves Oculomotor (III)] : extraocular motion intact [Cranial Nerves Facial (VII)] : face symmetrical [Cranial Nerves Vestibulocochlear (VIII)] : hearing was intact bilaterally [Cranial Nerves Glossopharyngeal (IX)] : tongue and palate midline [Cranial Nerves Accessory (XI - Cranial And Spinal)] : head turning and shoulder shrug symmetric [Cranial Nerves Hypoglossal (XII)] : there was no tongue deviation with protrusion [Motor Tone] : muscle tone was normal in all four extremities [Coordination - Dysmetria Impaired Finger-to-Nose Left Only] : not present on the left side [3+] : Patella right 3+ [2+] : Patella left 2+ [Plantar Reflex Right Only] : abnormal on the right [Plantar Reflex Left Only] : normal on the left [FreeTextEntry7] : There is slightly diminished sensation to light touch, pin, and vibration throughout the right side. [FreeTextEntry8] : Gait is broad-based and she requires a cane for ambulation. Balance is poor. [Optic Disc Abnormality] : the optic disc were normal in size and color [Edema] : there was no peripheral edema [Involuntary Movements] : no involuntary movements were seen

## 2024-03-25 ENCOUNTER — APPOINTMENT (OUTPATIENT)
Dept: RHEUMATOLOGY | Facility: CLINIC | Age: 79
End: 2024-03-25
Payer: MEDICAID

## 2024-03-25 VITALS
SYSTOLIC BLOOD PRESSURE: 122 MMHG | HEART RATE: 67 BPM | WEIGHT: 125 LBS | DIASTOLIC BLOOD PRESSURE: 60 MMHG | TEMPERATURE: 98.1 F | BODY MASS INDEX: 24.54 KG/M2 | OXYGEN SATURATION: 97 % | HEIGHT: 60 IN

## 2024-03-25 DIAGNOSIS — M81.0 AGE-RELATED OSTEOPOROSIS W/OUT CURRENT PATHOLOGICAL FRACTURE: ICD-10-CM

## 2024-03-25 DIAGNOSIS — R76.8 OTHER SPECIFIED ABNORMAL IMMUNOLOGICAL FINDINGS IN SERUM: ICD-10-CM

## 2024-03-25 DIAGNOSIS — M06.9 RHEUMATOID ARTHRITIS, UNSPECIFIED: ICD-10-CM

## 2024-03-25 PROCEDURE — 99214 OFFICE O/P EST MOD 30 MIN: CPT

## 2024-03-25 PROCEDURE — G2211 COMPLEX E/M VISIT ADD ON: CPT | Mod: NC,1L

## 2024-03-25 RX ORDER — METHOTREXATE 2.5 MG/1
2.5 TABLET ORAL
Qty: 16 | Refills: 3 | Status: ACTIVE | COMMUNITY
Start: 2021-12-20 | End: 1900-01-01

## 2024-03-25 RX ORDER — ADHESIVE TAPE 3"X 2.3 YD
50 MCG TAPE, NON-MEDICATED TOPICAL
Qty: 30 | Refills: 5 | Status: ACTIVE | COMMUNITY
Start: 2022-05-23 | End: 1900-01-01

## 2024-03-25 RX ORDER — ZOLEDRONIC ACID 5 MG/100ML
5 INJECTION INTRAVENOUS
Qty: 1 | Refills: 0 | Status: ACTIVE | COMMUNITY
Start: 2024-03-25

## 2024-03-25 RX ORDER — CHOLECALCIFEROL (VITAMIN D3) 125 MCG
50 MCG TABLET ORAL
Refills: 0 | Status: DISCONTINUED | COMMUNITY
End: 2024-03-25

## 2024-03-25 RX ORDER — CALCIUM CARBONATE/VITAMIN D3 600MG-5MCG
600-10 TABLET ORAL
Qty: 60 | Refills: 5 | Status: ACTIVE | COMMUNITY
Start: 2023-05-26 | End: 1900-01-01

## 2024-03-25 RX ORDER — FOLIC ACID 1 MG/1
1 TABLET ORAL DAILY
Qty: 30 | Refills: 5 | Status: ACTIVE | COMMUNITY
Start: 2021-12-20 | End: 1900-01-01

## 2024-03-25 NOTE — HISTORY OF PRESENT ILLNESS
[FreeTextEntry1] : 77 yo woman with history macular degeneration, HTN, HLD, Stroke with residual right sided weakness, hypothyroidism and osteoporosis with multiple fracture ( wrist/compression fracture/pelvic-all considered osteoporotic fx) and seropositive RA she also has quantiferon + with most likley LTB s/p INH Rx for 9 months and Hep B core total positive () being monitored with Hep b VL) .  In addition RUL ill define   osteoporosis: multiple fractures. previously on alendronate and prolia started in 2018.  she had 4 prolia injection.  she states that she experiences severe body pain after the prolia that last about 3 days. she also had a rash with prolia.  she does not want to continue prolia.    patient had DEXA 10/2020 showing T score L1-L4 -3.0  ( from -3.6 in 2016) and femoral neck -2.6 ( stable from 2016).    DEXA: 4/1/2023: spine -3.2, FN -3 ( was doing better on prolia but declines going back on prolia)  patient received reclast 3/16/2021, 6/21/2022, 7/31/2023 taking calcium and vit d  last dexa 10/2020     patient has macular degeneration can not start HCQ.  she is noted to be positive quantiferon and has RUL ill defines opacity on CCT.  had repeat CCT 5/2023  stable  5mm RUL pulm nodule and ELENA linear focus of atelectasis or scarring( unchanged).  she denies any sob or cough, wt loss , night sweats.  she was dismissed by pulmonary for stable pulm nodule.  she also noted to have hep b core positive and no VL. Saw GI and note states that she can either consider ppx or monitor VL while on immunosuppression.      TODAY: patient is on MTX 10mg and FA and denies any joint swelling.  she has minimal morning stiffness on some days.  she is on calcium and vit d for OP. she had reclast 7/31/2023 and tolerated this well.   she is hep B core positive and her recent VL was negative  she also has an abnormal CCT but these changes are stable and unchanged   she has dry eye and stable alopecia.  no cp/sob/cough, facial rash or oral lesions.  no history of serositis or low plts.  possible low wbc in the past??     CCP>250 negative MONE/ro/la  CPK normal  crp normal  esr 21  hand xray: T old healed radial metaphyseal Fx deformity with ulnar variance.  carpal bone normal, joint space preserved and no erosions  CCT: ill defines linear opacity RUL most likley represent an area of scarring

## 2024-03-25 NOTE — PHYSICAL EXAM
[General Appearance - Well Nourished] : well nourished [General Appearance - Well Developed] : well developed [Sclera] : the sclera and conjunctiva were normal [Hearing Threshold Finger Rub Not [place]] : hearing was normal [Nail Clubbing] : no clubbing  or cyanosis of the fingernails [Musculoskeletal - Swelling] : no joint swelling seen [Motor Tone] : muscle strength and tone were normal [] : no rash [Skin Lesions] : no skin lesions [Affect] : the affect was normal [Mood] : the mood was normal

## 2024-03-25 NOTE — ASSESSMENT
[FreeTextEntry1] : 77 yo woman with macular degeneration, HTN, HLD, hypothyroid, stroke R sided weakness, seropositive RA , Hep B core positive ( being monitored with VL) , quantiferon positive LTB ( s/p 9 months INH) , CCT with an nodule that is unchanged, OP with multiple fractures ( on reclast). seropositive RA , on MTX and FA and doing well, CDAI 4   --Reclast next 8/1/2024   -- dexa requested 4/2024  --cont calcium and vitamin D  --RA cont MTX with FA doing well CDAI 4  --abnormal CCT: last checked 5/2023 unchanged.  no need to f/u with pulm   --quantiferon positive treated for LTB :s/p 9 months of INH and vit B6. --hep b core positive: check VL every 6 months

## 2024-04-29 ENCOUNTER — OFFICE (OUTPATIENT)
Dept: URBAN - METROPOLITAN AREA CLINIC 94 | Facility: CLINIC | Age: 79
Setting detail: OPHTHALMOLOGY
End: 2024-04-29
Payer: MEDICAID

## 2024-04-29 DIAGNOSIS — H35.3231: ICD-10-CM

## 2024-04-29 DIAGNOSIS — H35.373: ICD-10-CM

## 2024-04-29 DIAGNOSIS — H35.033: ICD-10-CM

## 2024-04-29 DIAGNOSIS — H35.3211: ICD-10-CM

## 2024-04-29 PROCEDURE — 67028 INJECTION EYE DRUG: CPT | Mod: RT | Performed by: SPECIALIST

## 2024-04-29 PROCEDURE — 92134 CPTRZ OPH DX IMG PST SGM RTA: CPT | Performed by: SPECIALIST

## 2024-04-29 PROCEDURE — 92235 FLUORESCEIN ANGRPH MLTIFRAME: CPT | Performed by: SPECIALIST

## 2024-05-13 ENCOUNTER — OFFICE (OUTPATIENT)
Dept: URBAN - METROPOLITAN AREA CLINIC 94 | Facility: CLINIC | Age: 79
Setting detail: OPHTHALMOLOGY
End: 2024-05-13
Payer: MEDICAID

## 2024-05-13 DIAGNOSIS — H35.3231: ICD-10-CM

## 2024-05-13 PROCEDURE — 92134 CPTRZ OPH DX IMG PST SGM RTA: CPT | Performed by: SPECIALIST

## 2024-05-13 PROCEDURE — 92012 INTRM OPH EXAM EST PATIENT: CPT | Performed by: SPECIALIST

## 2024-05-13 ASSESSMENT — CONFRONTATIONAL VISUAL FIELD TEST (CVF)
OD_FINDINGS: FULL
OS_FINDINGS: FULL

## 2024-06-03 ENCOUNTER — NON-APPOINTMENT (OUTPATIENT)
Age: 79
End: 2024-06-03

## 2024-06-05 ENCOUNTER — APPOINTMENT (OUTPATIENT)
Dept: NEUROLOGY | Facility: CLINIC | Age: 79
End: 2024-06-05
Payer: MEDICAID

## 2024-06-05 VITALS
DIASTOLIC BLOOD PRESSURE: 60 MMHG | HEIGHT: 60 IN | BODY MASS INDEX: 24.54 KG/M2 | SYSTOLIC BLOOD PRESSURE: 120 MMHG | WEIGHT: 125 LBS

## 2024-06-05 DIAGNOSIS — I63.9 CEREBRAL INFARCTION, UNSPECIFIED: ICD-10-CM

## 2024-06-05 PROCEDURE — 99213 OFFICE O/P EST LOW 20 MIN: CPT

## 2024-06-05 PROCEDURE — G2211 COMPLEX E/M VISIT ADD ON: CPT | Mod: NC

## 2024-06-05 NOTE — PHYSICAL EXAM
[General Appearance - Alert] : alert [General Appearance - In No Acute Distress] : in no acute distress [Oriented To Time, Place, And Person] : oriented to person, place, and time [Affect] : the affect was normal [Memory Recent] : recent memory was not impaired [Memory Remote] : remote memory was not impaired [Over the Past 2 Weeks, Have You Felt Down, Depressed, or Hopeless?] : 1.) Over the past 2 weeks, have you felt down, depressed, or hopeless? No [Over the Past 2 Weeks, Have You Felt Little Interest or Pleasure Doing Things?] : 2.) Over the past 2 weeks, have you felt little interest or pleasure doing things? No [Aphasia] : no dysphasia/aphasia [Cranial Nerves Optic (II)] : visual acuity intact bilaterally,  visual fields full to confrontation, pupils equal round and reactive to light [Cranial Nerves Oculomotor (III)] : extraocular motion intact [Cranial Nerves Trigeminal (V)] : facial sensation intact symmetrically [Cranial Nerves Facial (VII)] : face symmetrical [Cranial Nerves Vestibulocochlear (VIII)] : hearing was intact bilaterally [Cranial Nerves Glossopharyngeal (IX)] : tongue and palate midline [Cranial Nerves Accessory (XI - Cranial And Spinal)] : head turning and shoulder shrug symmetric [Cranial Nerves Hypoglossal (XII)] : there was no tongue deviation with protrusion [Motor Tone] : muscle tone was normal in all four extremities [Coordination - Dysmetria Impaired Finger-to-Nose Left Only] : not present on the left side [3+] : Patella right 3+ [2+] : Patella left 2+ [Plantar Reflex Right Only] : abnormal on the right [Plantar Reflex Left Only] : normal on the left [FreeTextEntry7] : There is slightly diminished sensation to light touch, pin, and vibration throughout the right side. [FreeTextEntry8] : Gait is broad-based and she requires a cane for ambulation. Balance is poor. [Optic Disc Abnormality] : the optic disc were normal in size and color [Edema] : there was no peripheral edema [Involuntary Movements] : no involuntary movements were seen

## 2024-06-05 NOTE — ASSESSMENT
[FreeTextEntry1] : This is a 78-year-old woman who is status post stroke. She had an episode of unclear etiology that may have been a TIA. Her examination remains stable.  I have recommended she continue antiplatelet and statin medication.  Her blood pressure is well controlled on her current regimen.   I explained that should she have any further episodes she will need to go to the emergency department.  I will see her back in one year.

## 2024-06-05 NOTE — HISTORY OF PRESENT ILLNESS
[FreeTextEntry1] : I saw this patient in the office today.  As you recall, on 9/24/17 she developed right-sided weakness. This lasted about 15 minutes and resolved. She had a second episode a few hours later which also resolved. A few hours after that she has her episode at which persisted. She was in [place] at the time. She was taken to a hospital where she spent 3 days. Workup revealed a stroke and she was ultimately discharged with instructions for physical therapy.  She continues to have right-sided weakness.  The arm is involved more than the leg. It is with her daily. It is now mild. There is some associated sensory loss on the right side. There is no associated aphasia.  2/26/2024 visit: She has had no new symptoms since her last visit.  6/5/2024 visit: She described an episode while watching TV where she had difficulty reading things on the screen. This lasted about 5 minutes and resolved.

## 2024-06-05 NOTE — DATA REVIEWED
[de-identified] : Brain MRI was performed on 5/30/2024 at [facility]. The study demonstrated mild chronic ischemic change as well as a chronic infarct in the left corona radiata/lentiform nucleus. There was no acute infarct seen. [de-identified] : The patient's daughter brought a page from her CT scan from [place]. \par  The study demonstrated a small infarct in the left basal ganglia/internal capsule.\par  \par  Carotid ultrasounds and transcranial Dopplers were unremarkable in my office on 1/18/18.

## 2024-06-24 ENCOUNTER — OFFICE (OUTPATIENT)
Dept: URBAN - METROPOLITAN AREA CLINIC 94 | Facility: CLINIC | Age: 79
Setting detail: OPHTHALMOLOGY
End: 2024-06-24
Payer: MEDICAID

## 2024-06-24 DIAGNOSIS — H35.373: ICD-10-CM

## 2024-06-24 DIAGNOSIS — H43.813: ICD-10-CM

## 2024-06-24 DIAGNOSIS — G43.009: ICD-10-CM

## 2024-06-24 DIAGNOSIS — H35.033: ICD-10-CM

## 2024-06-24 DIAGNOSIS — H35.3231: ICD-10-CM

## 2024-06-24 PROCEDURE — 92134 CPTRZ OPH DX IMG PST SGM RTA: CPT | Performed by: SPECIALIST

## 2024-06-24 PROCEDURE — 92012 INTRM OPH EXAM EST PATIENT: CPT | Performed by: SPECIALIST

## 2024-06-24 ASSESSMENT — CONFRONTATIONAL VISUAL FIELD TEST (CVF)
OD_FINDINGS: FULL
OS_FINDINGS: FULL

## 2024-07-02 LAB
25(OH)D3 SERPL-MCNC: 59.2 NG/ML
ALBUMIN SERPL ELPH-MCNC: 4.2 G/DL
ALP BLD-CCNC: 58 U/L
ALT SERPL-CCNC: 15 U/L
ANION GAP SERPL CALC-SCNC: 13 MMOL/L
AST SERPL-CCNC: 22 U/L
BASOPHILS # BLD AUTO: 0.05 K/UL
BASOPHILS NFR BLD AUTO: 1.2 %
BILIRUB SERPL-MCNC: 0.4 MG/DL
BUN SERPL-MCNC: 15 MG/DL
CALCIUM SERPL-MCNC: 8.8 MG/DL
CHLORIDE SERPL-SCNC: 104 MMOL/L
CO2 SERPL-SCNC: 23 MMOL/L
CREAT SERPL-MCNC: 0.76 MG/DL
CRP SERPL-MCNC: <3 MG/L
EGFR: 80 ML/MIN/1.73M2
EOSINOPHIL # BLD AUTO: 0.4 K/UL
EOSINOPHIL NFR BLD AUTO: 9.8 %
ERYTHROCYTE [SEDIMENTATION RATE] IN BLOOD BY WESTERGREN METHOD: 23 MM/HR
GLUCOSE SERPL-MCNC: 91 MG/DL
HCT VFR BLD CALC: 38.8 %
HEPB DNA PCR INT: NOT DETECTED
HEPB DNA PCR LOG: NOT DETECTED LOGIU/ML
HGB BLD-MCNC: 12.1 G/DL
IMM GRANULOCYTES NFR BLD AUTO: 0 %
LYMPHOCYTES # BLD AUTO: 1.51 K/UL
LYMPHOCYTES NFR BLD AUTO: 36.8 %
MAGNESIUM SERPL-MCNC: 2.1 MG/DL
MAN DIFF?: NORMAL
MCHC RBC-ENTMCNC: 31.2 GM/DL
MCHC RBC-ENTMCNC: 32.1 PG
MCV RBC AUTO: 102.9 FL
MONOCYTES # BLD AUTO: 0.34 K/UL
MONOCYTES NFR BLD AUTO: 8.3 %
NEUTROPHILS # BLD AUTO: 1.8 K/UL
NEUTROPHILS NFR BLD AUTO: 43.9 %
PHOSPHATE SERPL-MCNC: 3.6 MG/DL
PLATELET # BLD AUTO: 189 K/UL
POTASSIUM SERPL-SCNC: 4.3 MMOL/L
PROT SERPL-MCNC: 6.3 G/DL
RBC # BLD: 3.77 M/UL
RBC # FLD: 13.7 %
SODIUM SERPL-SCNC: 140 MMOL/L
TSH SERPL-ACNC: 1.76 UIU/ML
WBC # FLD AUTO: 4.1 K/UL

## 2024-07-22 ENCOUNTER — APPOINTMENT (OUTPATIENT)
Dept: RHEUMATOLOGY | Facility: CLINIC | Age: 79
End: 2024-07-22
Payer: MEDICAID

## 2024-07-22 VITALS
HEIGHT: 60 IN | OXYGEN SATURATION: 98 % | HEART RATE: 73 BPM | SYSTOLIC BLOOD PRESSURE: 124 MMHG | DIASTOLIC BLOOD PRESSURE: 76 MMHG

## 2024-07-22 DIAGNOSIS — M06.9 RHEUMATOID ARTHRITIS, UNSPECIFIED: ICD-10-CM

## 2024-07-22 PROCEDURE — 99214 OFFICE O/P EST MOD 30 MIN: CPT

## 2024-07-22 RX ORDER — ZOLEDRONIC ACID 5 MG/100ML
5 INJECTION INTRAVENOUS
Qty: 1 | Refills: 0 | Status: ACTIVE | COMMUNITY
Start: 2024-07-22

## 2024-07-22 NOTE — HISTORY OF PRESENT ILLNESS
[FreeTextEntry1] : 79 yo woman with history macular degeneration, HTN, HLD, Stroke with residual right sided weakness, hypothyroidism and osteoporosis with multiple fracture ( wrist/compression fracture/pelvic-all considered osteoporotic fx) and seropositive RA she also has quantiferon + with most likley LTB s/p INH Rx for 9 months and Hep B core total positive, being monitored with Hep b VL ( VL neg 6/2024)  .  In addition RUL ill define opacity-pulm no longer needs to follow  osteoporosis: multiple fractures. previously on alendronate and prolia started in 2018.  she had 4 prolia injection.  she states that she experiences severe body pain after the prolia that last about 3 days. she also had a rash with prolia.  she does not want to continue prolia.    patient had DEXA 10/2020 showing T score L1-L4 -3.0  ( from -3.6 in 2016) and femoral neck -2.6 ( stable from 2016).    DEXA: 4/1/2023: spine -3.2, FN -3 ( was doing better on prolia but declines going back on prolia)  patient received reclast 3/16/2021, 6/21/2022, 7/31/2023 taking calcium and vit d    patient has macular degeneration cannot start HCQ.  she is noted to be positive QuantiFERON and has RUL ill defines opacity on CCT.  had repeat CCT 5/2023 stable  5mm RUL pulm nodule and ELENA linear focus of atelectasis or scarring( unchanged).  she denies any sob or cough, wt loss , night sweats.  she was dismissed by pulmonary for stable pulm nodule.  she also noted to have hep b core positive and no VL. Saw GI and note states that she can either consider ppx or monitor VL while on immunosuppression.      TODAY: patient is on MTX 10mg and FA and denies any joint swelling.  she has minimal morning stiffness on some days.  she is on calcium and vit d for OP. she had reclast 7/31/2023 and tolerated this well.   she is hep B core positive and her recent VL was negative  she also has an abnormal CCT but these changes are stable and unchanged   she has dry eye and stable alopecia.  no cp/sob/cough, facial rash or oral lesions.  no history of serositis or low plts.  possible low wbc in the past??     CCP>250 negative MONE/ro/la  CPK normal  crp normal  esr 21  hand xray: T old healed radial metaphyseal Fx deformity with ulnar variance.  carpal bone normal, joint space preserved and no erosions  CCT: ill defines linear opacity RUL most likley represent an area of scarring

## 2024-07-22 NOTE — ASSESSMENT
[FreeTextEntry1] : 79 yo woman with macular degeneration, HTN, HLD, hypothyroid, stroke R sided weakness, seropositive RA , Hep B core positive ( being monitored with VL) , quantiferon positive LTB ( s/p 9 months INH) , CCT with an nodule that is unchanged, OP with multiple fractures ( on reclast). seropositive RA , on MTX and FA and doing well, CDAI 4   --Reclast next 8/1/2024   -- dexa requested 3/11/2025  --cont calcium and vitamin D  --RA cont MTX with FA doing well CDAI 4  --abnormal CCT: last checked 5/2023 unchanged.  no need to f/u with pulm   --quantiferon positive treated for LTB :s/p 9 months of INH and vit B6. --hep b core positive: check VL every 6 months ( neg 6/2024)

## 2024-07-22 NOTE — PHYSICAL EXAM
[General Appearance - Well Nourished] : well nourished [General Appearance - Well Developed] : well developed [Sclera] : the sclera and conjunctiva were normal [Hearing Threshold Finger Rub Not [place]] : hearing was normal [] : no rash [Skin Lesions] : no skin lesions [Affect] : the affect was normal [Mood] : the mood was normal [Nail Clubbing] : no clubbing  or cyanosis of the fingernails [Musculoskeletal - Swelling] : no joint swelling seen [Motor Tone] : muscle strength and tone were normal

## 2024-07-22 NOTE — ASSESSMENT
[FreeTextEntry1] : 77 yo woman with macular degeneration, HTN, HLD, hypothyroid, stroke R sided weakness, seropositive RA , Hep B core positive ( being monitored with VL) , quantiferon positive LTB ( s/p 9 months INH) , CCT with an nodule that is unchanged, OP with multiple fractures ( on reclast). seropositive RA , on MTX and FA and doing well, CDAI 4   --Reclast next 8/1/2024   -- dexa requested 3/11/2025  --cont calcium and vitamin D  --RA cont MTX with FA doing well CDAI 4  --abnormal CCT: last checked 5/2023 unchanged.  no need to f/u with pulm   --quantiferon positive treated for LTB :s/p 9 months of INH and vit B6. --hep b core positive: check VL every 6 months ( neg 6/2024)

## 2024-07-22 NOTE — PHYSICAL EXAM
[General Appearance - Well Nourished] : well nourished [General Appearance - Well Developed] : well developed [Sclera] : the sclera and conjunctiva were normal [Hearing Threshold Finger Rub Not [place]] : hearing was normal [] : no rash [Skin Lesions] : no skin lesions [Affect] : the affect was normal [Mood] : the mood was normal [Nail Clubbing] : no clubbing  or cyanosis of the fingernails [Musculoskeletal - Swelling] : no joint swelling seen [Motor Tone] : muscle strength and tone were normal

## 2024-08-02 ENCOUNTER — APPOINTMENT (OUTPATIENT)
Dept: RHEUMATOLOGY | Facility: CLINIC | Age: 79
End: 2024-08-02
Payer: MEDICAID

## 2024-08-02 VITALS
RESPIRATION RATE: 17 BRPM | SYSTOLIC BLOOD PRESSURE: 108 MMHG | HEART RATE: 83 BPM | DIASTOLIC BLOOD PRESSURE: 61 MMHG | OXYGEN SATURATION: 98 % | TEMPERATURE: 97.9 F

## 2024-08-02 VITALS
HEART RATE: 69 BPM | SYSTOLIC BLOOD PRESSURE: 121 MMHG | DIASTOLIC BLOOD PRESSURE: 63 MMHG | RESPIRATION RATE: 17 BRPM | OXYGEN SATURATION: 98 %

## 2024-08-02 PROCEDURE — 96374 THER/PROPH/DIAG INJ IV PUSH: CPT

## 2024-08-02 NOTE — HISTORY OF PRESENT ILLNESS
[Denies] : Denies [No] : No [Yes] : Yes [Declined] : Declined [Informed consent documented in EHR.] : Informed consent documented in EHR. [Creatinine] : creatinine [Bone Density/DEXA] : Bone Density/DEXA [Left upper extremity] : Left upper extremity [24g] : 24g [Start Time: ___] : Medication Start Time: [unfilled] [End Time: ___] : Medication End Time: [unfilled] [Medication Name: ___] : Medication Name: [unfilled] [Total Amount Administered: ___] : Total Amount Administered: [unfilled] [IV discontinued. Intact. No signs or symptoms of IV complications noted. Time: ___] : IV discontinued. Intact. No signs or symptoms of IV complications noted. Time: [unfilled] [Patient  instructed to seek medical attention with signs and symptoms of adverse effects] : Patient  instructed to seek medical attention with signs and symptoms of adverse effects [Patient left unit in no acute distress] : Patient left unit in no acute distress [Medications administered as ordered and tolerated well.] : Medications administered as ordered and tolerated well. [de-identified] : [id number]

## 2024-08-19 ENCOUNTER — OFFICE (OUTPATIENT)
Dept: URBAN - METROPOLITAN AREA CLINIC 94 | Facility: CLINIC | Age: 79
Setting detail: OPHTHALMOLOGY
End: 2024-08-19
Payer: MEDICAID

## 2024-08-19 DIAGNOSIS — H35.033: ICD-10-CM

## 2024-08-19 DIAGNOSIS — H35.3231: ICD-10-CM

## 2024-08-19 DIAGNOSIS — H43.813: ICD-10-CM

## 2024-08-19 DIAGNOSIS — H35.373: ICD-10-CM

## 2024-08-19 DIAGNOSIS — G43.009: ICD-10-CM

## 2024-08-19 PROCEDURE — 92014 COMPRE OPH EXAM EST PT 1/>: CPT | Performed by: SPECIALIST

## 2024-08-19 PROCEDURE — 92134 CPTRZ OPH DX IMG PST SGM RTA: CPT | Performed by: SPECIALIST

## 2024-08-19 ASSESSMENT — CONFRONTATIONAL VISUAL FIELD TEST (CVF)
OD_FINDINGS: FULL
OS_FINDINGS: FULL

## 2024-10-21 ENCOUNTER — APPOINTMENT (OUTPATIENT)
Dept: RHEUMATOLOGY | Facility: CLINIC | Age: 79
End: 2024-10-21
Payer: MEDICAID

## 2024-10-21 VITALS
HEART RATE: 72 BPM | OXYGEN SATURATION: 98 % | WEIGHT: 125 LBS | TEMPERATURE: 98 F | BODY MASS INDEX: 24.54 KG/M2 | SYSTOLIC BLOOD PRESSURE: 124 MMHG | RESPIRATION RATE: 17 BRPM | HEIGHT: 60 IN | DIASTOLIC BLOOD PRESSURE: 60 MMHG

## 2024-10-21 DIAGNOSIS — M81.0 AGE-RELATED OSTEOPOROSIS W/OUT CURRENT PATHOLOGICAL FRACTURE: ICD-10-CM

## 2024-10-21 DIAGNOSIS — M06.9 RHEUMATOID ARTHRITIS, UNSPECIFIED: ICD-10-CM

## 2024-10-21 PROCEDURE — 99214 OFFICE O/P EST MOD 30 MIN: CPT

## 2024-10-21 NOTE — PHYSICAL EXAM
[General Appearance - Well Nourished] : well nourished [General Appearance - Well Developed] : well developed [Sclera] : the sclera and conjunctiva were normal [Hearing Threshold Finger Rub Not [place]] : hearing was normal [] : no rash [Skin Lesions] : no skin lesions [Affect] : the affect was normal [Mood] : the mood was normal [Nail Clubbing] : no clubbing  or cyanosis of the fingernails [Musculoskeletal - Swelling] : no joint swelling seen [Motor Tone] : muscle strength and tone were normal

## 2024-10-21 NOTE — HISTORY OF PRESENT ILLNESS
[FreeTextEntry1] : 79 yo woman with history macular degeneration, HTN, HLD, Stroke with residual right sided weakness, hypothyroidism and osteoporosis with multiple fracture ( wrist/compression fracture/pelvic-all considered osteoporotic fx) and seropositive RA she also has quantiferon + with most likley LTB s/p INH Rx for 9 months and Hep B core total positive, being monitored with Hep b VL ( VL neg 6/2024)  .  In addition RUL ill define opacity-pulm no longer needs to follow  osteoporosis: multiple fractures. previously on alendronate and prolia started in 2018.  she had 4 prolia injection.  she states that she experiences severe body pain after the prolia that last about 3 days. she also had a rash with prolia.  she does not want to continue prolia.    patient had DEXA 10/2020 showing T score L1-L4 -3.0  ( from -3.6 in 2016) and femoral neck -2.6 ( stable from 2016).    DEXA: 4/1/2023: spine -3.2, FN -3 ( was doing better on prolia but declines going back on prolia)  patient received reclast 3/16/2021, 6/21/2022, 7/31/2023, 8/2/2024 taking calcium and vit d   patient has macular degeneration cannot start HCQ.  she is noted to be positive QuantiFERON and has RUL ill defines opacity on CCT.  had repeat CCT 5/2023 stable 5mm RUL pulm nodule and GRAEME linear focus of atelectasis or scarring(unchanged).  she denies any sob or cough, wt loss, night sweats.  she was dismissed by pulmonary for stable pulm nodule.  she also noted to have hep b core positive and no VL. Saw GI and note states that she can either consider ppx or monitor VL while on immunosuppression.      TODAY: patient is on MTX 10mg and FA and denies any joint swelling.  she has minimal morning stiffness on some days.  she is on calcium and vit d for OP. she had reclast 8/2 /2024 and tolerated this well.   she is hep B core positive and her recent VL was negative ( 6/2024)  she also has an abnormal CCT but these changes are stable and unchanged   she has dry eye and stable alopecia.  no cp/sob/cough, facial rash or oral lesions.  no history of serositis or low plts.  possible low wbc in the past??     CCP>250 negative FABIOLA/ro/la  CPK normal  crp normal  esr 21  hand xray: T old healed radial metaphyseal Fx deformity with ulnar variance.  carpal bone normal, joint space preserved and no erosions  CCT: ill defines linear opacity RUL most likley represent an area of scarring

## 2024-11-11 NOTE — PATIENT PROFILE ADULT - HOME ACCESSIBILITY CONCERNS
No complaints of dyspnea or chest pain. Heparin gtt.     Problem: Patient Centered Care  Goal: Patient preferences are identified and integrated in the patient's plan of care  Description: Interventions:  - What would you like us to know as we care for you?   - Provide timely, complete, and accurate information to patient/family  - Incorporate patient and family knowledge, values, beliefs, and cultural backgrounds into the planning and delivery of care  - Encourage patient/family to participate in care and decision-making at the level they choose  - Honor patient and family perspectives and choices  Outcome: Progressing     Problem: Diabetes/Glucose Control  Goal: Glucose maintained within prescribed range  Description: INTERVENTIONS:  - Monitor Blood Glucose as ordered  - Assess for signs and symptoms of hyperglycemia and hypoglycemia  - Administer ordered medications to maintain glucose within target range  - Assess barriers to adequate nutritional intake and initiate nutrition consult as needed  - Instruct patient on self management of diabetes  Outcome: Progressing     Problem: Patient/Family Goals  Goal: Patient/Family Long Term Goal  Description: Patient's Long Term Goal:     Interventions:  -   - See additional Care Plan goals for specific interventions  Outcome: Progressing  Goal: Patient/Family Short Term Goal  Description: Patient's Short Term Goal:     Interventions:   -   - See additional Care Plan goals for specific interventions  Outcome: Progressing     Problem: CARDIOVASCULAR - ADULT  Goal: Maintains optimal cardiac output and hemodynamic stability  Description: INTERVENTIONS:  - Monitor vital signs, rhythm, and trends  - Monitor for bleeding, hypotension and signs of decreased cardiac output  - Evaluate effectiveness of vasoactive medications to optimize hemodynamic stability  - Monitor arterial and/or venous puncture sites for bleeding and/or hematoma  - Assess quality of pulses, skin color and  temperature  - Assess for signs of decreased coronary artery perfusion - ex. Angina  - Evaluate fluid balance, assess for edema, trend weights  Outcome: Progressing  Goal: Absence of cardiac arrhythmias or at baseline  Description: INTERVENTIONS:  - Continuous cardiac monitoring, monitor vital signs, obtain 12 lead EKG if indicated  - Evaluate effectiveness of antiarrhythmic and heart rate control medications as ordered  - Initiate emergency measures for life threatening arrhythmias  - Monitor electrolytes and administer replacement therapy as ordered  Outcome: Progressing      none

## 2024-11-18 ENCOUNTER — OFFICE (OUTPATIENT)
Dept: URBAN - METROPOLITAN AREA CLINIC 94 | Facility: CLINIC | Age: 79
Setting detail: OPHTHALMOLOGY
End: 2024-11-18
Payer: MEDICAID

## 2024-11-18 DIAGNOSIS — H35.3231: ICD-10-CM

## 2024-11-18 DIAGNOSIS — H35.033: ICD-10-CM

## 2024-11-18 DIAGNOSIS — H43.813: ICD-10-CM

## 2024-11-18 DIAGNOSIS — H35.373: ICD-10-CM

## 2024-11-18 DIAGNOSIS — G43.009: ICD-10-CM

## 2024-11-18 PROCEDURE — 92134 CPTRZ OPH DX IMG PST SGM RTA: CPT | Performed by: SPECIALIST

## 2024-11-18 PROCEDURE — 92014 COMPRE OPH EXAM EST PT 1/>: CPT | Performed by: SPECIALIST

## 2024-11-18 ASSESSMENT — TONOMETRY
OS_IOP_MMHG: 15
OD_IOP_MMHG: 15

## 2024-11-18 ASSESSMENT — KERATOMETRY
OD_K1POWER_DIOPTERS: 40.50
OD_K2POWER_DIOPTERS: 43.50
OS_AXISANGLE_DEGREES: 180
OS_K1POWER_DIOPTERS: 42.25
OS_K2POWER_DIOPTERS: 43.50
OD_AXISANGLE_DEGREES: 010

## 2024-11-18 ASSESSMENT — REFRACTION_AUTOREFRACTION
OD_SPHERE: +0.25
OS_CYLINDER: -1.25
OD_AXIS: 090
OS_AXIS: 095
OS_SPHERE: 0.00
OD_CYLINDER: -1.75

## 2024-11-18 ASSESSMENT — CORNEAL EDEMA CLINICAL DESCRIPTION: OD_CORNEALEDEMA: T

## 2024-11-18 ASSESSMENT — CONFRONTATIONAL VISUAL FIELD TEST (CVF)
OD_FINDINGS: FULL
OS_FINDINGS: FULL

## 2024-11-18 ASSESSMENT — VISUAL ACUITY
OD_BCVA: 20/40-1
OS_BCVA: 20/40

## 2025-02-17 ENCOUNTER — OFFICE (OUTPATIENT)
Dept: URBAN - METROPOLITAN AREA CLINIC 94 | Facility: CLINIC | Age: 80
Setting detail: OPHTHALMOLOGY
End: 2025-02-17
Payer: MEDICAID

## 2025-02-17 DIAGNOSIS — H43.813: ICD-10-CM

## 2025-02-17 DIAGNOSIS — G43.009: ICD-10-CM

## 2025-02-17 DIAGNOSIS — H35.3231: ICD-10-CM

## 2025-02-17 PROCEDURE — 92012 INTRM OPH EXAM EST PATIENT: CPT | Performed by: SPECIALIST

## 2025-02-17 PROCEDURE — 92235 FLUORESCEIN ANGRPH MLTIFRAME: CPT | Performed by: SPECIALIST

## 2025-02-17 PROCEDURE — 92134 CPTRZ OPH DX IMG PST SGM RTA: CPT | Performed by: SPECIALIST

## 2025-02-17 ASSESSMENT — REFRACTION_AUTOREFRACTION
OD_SPHERE: +0.25
OD_CYLINDER: -1.75
OS_CYLINDER: -1.25
OS_SPHERE: 0.00
OD_AXIS: 090
OS_AXIS: 095

## 2025-02-17 ASSESSMENT — KERATOMETRY
OD_K1POWER_DIOPTERS: 40.50
OD_K2POWER_DIOPTERS: 43.50
OD_AXISANGLE_DEGREES: 010
OS_K2POWER_DIOPTERS: 43.50
OS_K1POWER_DIOPTERS: 42.25
OS_AXISANGLE_DEGREES: 180

## 2025-02-17 ASSESSMENT — CONFRONTATIONAL VISUAL FIELD TEST (CVF)
OD_FINDINGS: FULL
OS_FINDINGS: FULL

## 2025-02-17 ASSESSMENT — CORNEAL EDEMA CLINICAL DESCRIPTION: OD_CORNEALEDEMA: T

## 2025-02-17 ASSESSMENT — VISUAL ACUITY
OD_BCVA: 20/30
OS_BCVA: 20/25-1

## 2025-02-17 ASSESSMENT — TONOMETRY
OS_IOP_MMHG: 13
OD_IOP_MMHG: 20

## 2025-02-26 ENCOUNTER — APPOINTMENT (OUTPATIENT)
Dept: NEUROLOGY | Facility: CLINIC | Age: 80
End: 2025-02-26

## 2025-02-27 ENCOUNTER — RX RENEWAL (OUTPATIENT)
Age: 80
End: 2025-02-27

## 2025-04-03 ENCOUNTER — RX RENEWAL (OUTPATIENT)
Age: 80
End: 2025-04-03

## 2025-04-04 ENCOUNTER — APPOINTMENT (OUTPATIENT)
Dept: RHEUMATOLOGY | Facility: CLINIC | Age: 80
End: 2025-04-04
Payer: MEDICAID

## 2025-04-04 VITALS
HEIGHT: 60 IN | DIASTOLIC BLOOD PRESSURE: 60 MMHG | OXYGEN SATURATION: 98 % | TEMPERATURE: 98.1 F | SYSTOLIC BLOOD PRESSURE: 112 MMHG | HEART RATE: 76 BPM

## 2025-04-04 DIAGNOSIS — M81.0 AGE-RELATED OSTEOPOROSIS W/OUT CURRENT PATHOLOGICAL FRACTURE: ICD-10-CM

## 2025-04-04 DIAGNOSIS — R76.8 OTHER SPECIFIED ABNORMAL IMMUNOLOGICAL FINDINGS IN SERUM: ICD-10-CM

## 2025-04-04 PROCEDURE — 99214 OFFICE O/P EST MOD 30 MIN: CPT

## 2025-04-04 RX ORDER — DICLOFENAC SODIUM 10 MG/G
1 GEL TOPICAL DAILY
Qty: 1 | Refills: 2 | Status: ACTIVE | COMMUNITY
Start: 2025-04-04 | End: 1900-01-01

## 2025-04-04 NOTE — HISTORY OF PRESENT ILLNESS
[FreeTextEntry1] : 79 yo woman with history macular degeneration, HTN, HLD, Stroke with residual right sided weakness, hypothyroidism and osteoporosis with multiple fracture ( wrist/compression fracture/pelvic-all considered osteoporotic fx) and seropositive RA she also has quantiferon + with most likley LTB s/p INH Rx for 9 months and Hep B core total positive, being monitored with Hep b VL ( VL neg 6/2024)  .  In addition RUL ill define opacity-pulm no longer needs to follow  osteoporosis: multiple fractures. previously on alendronate and prolia started in 2018.  she had 4 prolia injection.  she states that she experiences severe body pain after the prolia that last about 3 days. she also had a rash with prolia.  she does not want to continue prolia.    patient had DEXA 10/2020 showing T score L1-L4 -3.0  ( from -3.6 in 2016) and femoral neck -2.6 ( stable from 2016).    DEXA: 4/1/2023: spine -3.2, FN -3 ( was doing better on prolia but declines going back on prolia)  patient received reclast 3/16/2021, 6/21/2022, 7/31/2023, 8/2/2024 taking calcium and vit d   patient has macular degeneration cannot start HCQ.  she is noted to be positive QuantiFERON and has RUL ill defines opacity on CCT.  had repeat CCT 5/2023 stable 5mm RUL pulm nodule and GRAEME linear focus of atelectasis or scarring(unchanged).  she denies any sob or cough, wt loss, night sweats.  she was dismissed by pulmonary for stable pulm nodule.  she also noted to have hep b core positive and no VL. Saw GI and note states that she can either consider ppx or monitor VL while on immunosuppression.      TODAY: patient is on MTX 10mg and FA and denies any joint swelling.  she has minimal morning stiffnes  she is on calcium and vit d for OP. she had reclast 8/2024.  last dexa 4/2/2023.  patient states that she occasionally notices left 3th DIP swelling and erythema and tenderness.  she has [place] nodes /2024 and tolerated this well.   she is hep B core positive and her recent VL was negative ( 6/2024)  she also has an abnormal CCT but these changes are stable and unchanged   she has dry eye and stable alopecia.  no cp/sob/cough, facial rash or oral lesions.  no history of serositis or low plts.  possible low wbc in the past??     CCP>250 negative FABIOLA/ro/la  CPK normal  crp normal  esr 21  hand xray: T old healed radial metaphyseal Fx deformity with ulnar variance.  carpal bone normal, joint space preserved and no erosions  CCT: ill defines linear opacity RUL most likley represent an area of scarring

## 2025-04-04 NOTE — ASSESSMENT
[FreeTextEntry1] : 78 yo woman with macular degeneration, HTN, HLD, hypothyroid, stroke R sided weakness, seropositive RA , Hep B core positive ( being monitored with VL) , QuantiFERON positive LTB ( s/p 9 months INH) , CCT with a nodule that is unchanged, OP with multiple fractures ( on reclast). seropositive RA , on MTX and FA and doing well, CDAI 4   OP:  --cont Reclast next 8/3/2025   -- dexa requested today   --cont calcium and vitamin D  RA:  --RA cont MTX with FA doing well CDAI 4 --abnormal CCT: last checked 5/2023 stable and unchanged.  no need to f/u with pulm  --QuantiFERON positive treated for LTB :s/p 9 months of INH and vit B6. 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

## 2025-04-04 NOTE — PHYSICAL EXAM
[General Appearance - Well Nourished] : well nourished [General Appearance - Well Developed] : well developed [Sclera] : the sclera and conjunctiva were normal [Hearing Threshold Finger Rub Not [place]] : hearing was normal [] : no rash [Skin Lesions] : no skin lesions [Affect] : the affect was normal [Mood] : the mood was normal [Nail Clubbing] : no clubbing  or cyanosis of the fingernails [Musculoskeletal - Swelling] : no joint swelling seen [Motor Tone] : muscle strength and tone were normal [FreeTextEntry1] : heberden nodes noted , no synovvitis

## 2025-06-16 ENCOUNTER — OFFICE (OUTPATIENT)
Dept: URBAN - METROPOLITAN AREA CLINIC 94 | Facility: CLINIC | Age: 80
Setting detail: OPHTHALMOLOGY
End: 2025-06-16
Payer: MEDICAID

## 2025-06-16 DIAGNOSIS — H35.3231: ICD-10-CM

## 2025-06-16 DIAGNOSIS — H35.373: ICD-10-CM

## 2025-06-16 DIAGNOSIS — H35.033: ICD-10-CM

## 2025-06-16 DIAGNOSIS — H43.813: ICD-10-CM

## 2025-06-16 PROCEDURE — 92235 FLUORESCEIN ANGRPH MLTIFRAME: CPT | Performed by: SPECIALIST

## 2025-06-16 PROCEDURE — 92134 CPTRZ OPH DX IMG PST SGM RTA: CPT | Performed by: SPECIALIST

## 2025-06-16 PROCEDURE — 92014 COMPRE OPH EXAM EST PT 1/>: CPT | Performed by: SPECIALIST

## 2025-06-16 ASSESSMENT — KERATOMETRY
OD_K2POWER_DIOPTERS: 43.50
OS_AXISANGLE_DEGREES: 180
OD_K1POWER_DIOPTERS: 40.50
OD_AXISANGLE_DEGREES: 010
OS_K1POWER_DIOPTERS: 42.25
OS_K2POWER_DIOPTERS: 43.50

## 2025-06-16 ASSESSMENT — REFRACTION_AUTOREFRACTION
OS_AXIS: 095
OS_CYLINDER: -1.25
OD_CYLINDER: -1.75
OD_SPHERE: +0.25
OS_SPHERE: 0.00
OD_AXIS: 090

## 2025-06-16 ASSESSMENT — VISUAL ACUITY
OS_BCVA: 20/30
OD_BCVA: 20/30-1

## 2025-06-16 ASSESSMENT — TONOMETRY
OS_IOP_MMHG: 15
OD_IOP_MMHG: 12

## 2025-06-16 ASSESSMENT — CORNEAL EDEMA CLINICAL DESCRIPTION: OD_CORNEALEDEMA: T

## 2025-06-16 ASSESSMENT — CONFRONTATIONAL VISUAL FIELD TEST (CVF)
OS_FINDINGS: FULL
OD_FINDINGS: FULL

## 2025-06-23 ENCOUNTER — OFFICE (OUTPATIENT)
Dept: URBAN - METROPOLITAN AREA CLINIC 94 | Facility: CLINIC | Age: 80
Setting detail: OPHTHALMOLOGY
End: 2025-06-23
Payer: MEDICAID

## 2025-06-23 DIAGNOSIS — H35.373: ICD-10-CM

## 2025-06-23 DIAGNOSIS — H35.3211: ICD-10-CM

## 2025-06-23 PROCEDURE — 67028 INJECTION EYE DRUG: CPT | Mod: RT | Performed by: SPECIALIST

## 2025-06-23 PROCEDURE — 92134 CPTRZ OPH DX IMG PST SGM RTA: CPT | Performed by: SPECIALIST

## 2025-06-23 ASSESSMENT — KERATOMETRY
OD_AXISANGLE_DEGREES: 010
OS_K2POWER_DIOPTERS: 43.50
OD_K2POWER_DIOPTERS: 43.50
OD_K1POWER_DIOPTERS: 40.50
OS_AXISANGLE_DEGREES: 180
OS_K1POWER_DIOPTERS: 42.25

## 2025-06-23 ASSESSMENT — CONFRONTATIONAL VISUAL FIELD TEST (CVF)
OS_FINDINGS: FULL
OD_FINDINGS: FULL

## 2025-06-23 ASSESSMENT — REFRACTION_AUTOREFRACTION
OS_SPHERE: 0.00
OD_SPHERE: +0.25
OD_AXIS: 090
OS_CYLINDER: -1.25
OD_CYLINDER: -1.75
OS_AXIS: 095

## 2025-06-23 ASSESSMENT — VISUAL ACUITY
OS_BCVA: 20/30-1
OD_BCVA: 20/30+1

## 2025-06-23 ASSESSMENT — TONOMETRY
OD_IOP_MMHG: 17
OS_IOP_MMHG: 17

## 2025-06-23 ASSESSMENT — CORNEAL EDEMA CLINICAL DESCRIPTION: OD_CORNEALEDEMA: T

## 2025-06-30 ENCOUNTER — OFFICE (OUTPATIENT)
Dept: URBAN - METROPOLITAN AREA CLINIC 94 | Facility: CLINIC | Age: 80
Setting detail: OPHTHALMOLOGY
End: 2025-06-30
Payer: MEDICAID

## 2025-06-30 DIAGNOSIS — H35.3231: ICD-10-CM

## 2025-06-30 DIAGNOSIS — G43.009: ICD-10-CM

## 2025-06-30 DIAGNOSIS — H35.033: ICD-10-CM

## 2025-06-30 DIAGNOSIS — H43.813: ICD-10-CM

## 2025-06-30 DIAGNOSIS — H35.373: ICD-10-CM

## 2025-06-30 PROCEDURE — 92012 INTRM OPH EXAM EST PATIENT: CPT | Performed by: SPECIALIST

## 2025-06-30 PROCEDURE — 92134 CPTRZ OPH DX IMG PST SGM RTA: CPT | Performed by: SPECIALIST

## 2025-06-30 ASSESSMENT — TONOMETRY
OS_IOP_MMHG: 17
OD_IOP_MMHG: 13

## 2025-06-30 ASSESSMENT — CONFRONTATIONAL VISUAL FIELD TEST (CVF)
OS_FINDINGS: FULL
OD_FINDINGS: FULL

## 2025-06-30 ASSESSMENT — VISUAL ACUITY
OD_BCVA: 20/40
OS_BCVA: 20/25

## 2025-07-25 ENCOUNTER — RX RENEWAL (OUTPATIENT)
Age: 80
End: 2025-07-25

## 2025-08-04 ENCOUNTER — OFFICE (OUTPATIENT)
Dept: URBAN - METROPOLITAN AREA CLINIC 94 | Facility: CLINIC | Age: 80
Setting detail: OPHTHALMOLOGY
End: 2025-08-04
Payer: MEDICAID

## 2025-08-04 DIAGNOSIS — H35.3231: ICD-10-CM

## 2025-08-04 DIAGNOSIS — H43.813: ICD-10-CM

## 2025-08-04 DIAGNOSIS — G43.009: ICD-10-CM

## 2025-08-04 PROCEDURE — 92134 CPTRZ OPH DX IMG PST SGM RTA: CPT | Performed by: SPECIALIST

## 2025-08-04 PROCEDURE — 92012 INTRM OPH EXAM EST PATIENT: CPT | Performed by: SPECIALIST

## 2025-08-04 ASSESSMENT — CONFRONTATIONAL VISUAL FIELD TEST (CVF)
OD_FINDINGS: FULL
OS_FINDINGS: FULL

## 2025-08-04 ASSESSMENT — VISUAL ACUITY
OD_BCVA: 20/40-1
OS_BCVA: 20/25

## 2025-08-04 ASSESSMENT — KERATOMETRY
OS_K1POWER_DIOPTERS: 42.25
OD_K2POWER_DIOPTERS: 43.50
OD_K1POWER_DIOPTERS: 40.50
OD_AXISANGLE_DEGREES: 010
OS_AXISANGLE_DEGREES: 180
OS_K2POWER_DIOPTERS: 43.50

## 2025-08-04 ASSESSMENT — REFRACTION_AUTOREFRACTION
OD_CYLINDER: -1.75
OD_AXIS: 090
OD_SPHERE: +0.25
OS_CYLINDER: -1.25
OS_AXIS: 095
OS_SPHERE: 0.00

## 2025-08-04 ASSESSMENT — CORNEAL EDEMA CLINICAL DESCRIPTION: OD_CORNEALEDEMA: T

## 2025-08-11 ENCOUNTER — APPOINTMENT (OUTPATIENT)
Dept: RHEUMATOLOGY | Facility: CLINIC | Age: 80
End: 2025-08-11
Payer: MEDICAID

## 2025-08-11 VITALS
HEART RATE: 75 BPM | HEIGHT: 60 IN | SYSTOLIC BLOOD PRESSURE: 112 MMHG | WEIGHT: 125 LBS | DIASTOLIC BLOOD PRESSURE: 60 MMHG | OXYGEN SATURATION: 98 % | TEMPERATURE: 97.7 F | BODY MASS INDEX: 24.54 KG/M2

## 2025-08-11 DIAGNOSIS — M81.0 AGE-RELATED OSTEOPOROSIS W/OUT CURRENT PATHOLOGICAL FRACTURE: ICD-10-CM

## 2025-08-11 DIAGNOSIS — M06.9 RHEUMATOID ARTHRITIS, UNSPECIFIED: ICD-10-CM

## 2025-08-11 PROCEDURE — 99214 OFFICE O/P EST MOD 30 MIN: CPT

## 2025-08-11 RX ORDER — ZOLEDRONIC ACID 5 MG/100ML
5 INJECTION INTRAVENOUS
Qty: 1 | Refills: 0 | Status: ACTIVE | COMMUNITY
Start: 2025-08-11

## 2025-08-12 ENCOUNTER — NON-APPOINTMENT (OUTPATIENT)
Age: 80
End: 2025-08-12

## 2025-08-12 ENCOUNTER — APPOINTMENT (OUTPATIENT)
Dept: NEUROLOGY | Facility: CLINIC | Age: 80
End: 2025-08-12
Payer: MEDICAID

## 2025-08-12 VITALS
WEIGHT: 125 LBS | SYSTOLIC BLOOD PRESSURE: 130 MMHG | DIASTOLIC BLOOD PRESSURE: 60 MMHG | BODY MASS INDEX: 24.54 KG/M2 | HEIGHT: 60 IN

## 2025-08-12 DIAGNOSIS — I63.9 CEREBRAL INFARCTION, UNSPECIFIED: ICD-10-CM

## 2025-08-12 PROCEDURE — G2211 COMPLEX E/M VISIT ADD ON: CPT | Mod: NC

## 2025-08-12 PROCEDURE — 99213 OFFICE O/P EST LOW 20 MIN: CPT

## 2025-08-12 RX ORDER — LEVOTHYROXINE SODIUM 0.07 MG/1
75 TABLET ORAL
Qty: 90 | Refills: 0 | Status: ACTIVE | COMMUNITY
Start: 2025-05-01

## 2025-08-13 ENCOUNTER — APPOINTMENT (OUTPATIENT)
Dept: RHEUMATOLOGY | Facility: CLINIC | Age: 80
End: 2025-08-13
Payer: MEDICAID

## 2025-08-13 VITALS
SYSTOLIC BLOOD PRESSURE: 118 MMHG | HEART RATE: 67 BPM | TEMPERATURE: 97.5 F | OXYGEN SATURATION: 96 % | DIASTOLIC BLOOD PRESSURE: 64 MMHG | RESPIRATION RATE: 17 BRPM

## 2025-08-13 VITALS
SYSTOLIC BLOOD PRESSURE: 103 MMHG | RESPIRATION RATE: 16 BRPM | DIASTOLIC BLOOD PRESSURE: 62 MMHG | OXYGEN SATURATION: 98 % | HEART RATE: 69 BPM

## 2025-08-13 PROCEDURE — 96374 THER/PROPH/DIAG INJ IV PUSH: CPT

## 2025-08-13 RX ORDER — ZOLEDRONIC ACID 5 MG/100ML
5 INJECTION INTRAVENOUS
Qty: 0 | Refills: 0 | Status: COMPLETED | OUTPATIENT
Start: 2025-08-13

## (undated) DEVICE — D HELP - CLEARVIEW CLEARIFY SYSTEM

## (undated) DEVICE — INSUFFLATION NDL ETHICON ENDOPATH PNEUMOPARITONEUM 150MM

## (undated) DEVICE — GLV 8 PROTEXIS (WHITE)

## (undated) DEVICE — TUBING STRYKEFLOW II SUCTION / IRRIGATOR

## (undated) DEVICE — DRSG STERISTRIPS 0.5 X 4"

## (undated) DEVICE — SYR CONTROL LUER LOK 10CC

## (undated) DEVICE — DISSECTOR ENDOSCOPIC KITTNER SINGLE TIP

## (undated) DEVICE — ENDOCATCH 10MM SPECIMEN POUCH

## (undated) DEVICE — ELCTR CORD FOOTSWITCH 1PLR LAPSCP 10FT

## (undated) DEVICE — Device

## (undated) DEVICE — SUT PDS II 0 18" ENDOLOOP LIGATURE

## (undated) DEVICE — GLV 8 PROTEXIS (BLUE)

## (undated) DEVICE — VENODYNE/SCD SLEEVE CALF MEDIUM

## (undated) DEVICE — WARMING BLANKET UPPER ADULT

## (undated) DEVICE — TROCAR ETHICON ENDOPATH XCEL UNIVERSAL SLEEVE WITH OPTIVIEW 5MM X 100MM

## (undated) DEVICE — SUT VICRYL PLUS 4-0 18" PS-2 UNDYED

## (undated) DEVICE — SUT VICRYL 0 27" UR-6

## (undated) DEVICE — TROCAR COVIDIEN VERSAPORT BLADELESS OPTICAL 11MM STANDARD

## (undated) DEVICE — TROCAR COVIDIEN VERSAPORT BLADELESS OPTICAL 5MM STANDARD

## (undated) DEVICE — BLADE SURGICAL #11 CARBON

## (undated) DEVICE — PACK GENERAL LAPAROSCOPY

## (undated) DEVICE — SUT MONOCRYL 4-0 27" PS-2 UNDYED

## (undated) DEVICE — SUT VICRYL 2-0 27" CP-2 UNDYED

## (undated) DEVICE — INSUFFLATION NDL ETHICON ENDOPATH PNEUMOPARITONEUM 120MM

## (undated) DEVICE — BLADE SURGICAL #15 CARBON